# Patient Record
Sex: MALE | Race: WHITE | NOT HISPANIC OR LATINO | Employment: FULL TIME | URBAN - NONMETROPOLITAN AREA
[De-identification: names, ages, dates, MRNs, and addresses within clinical notes are randomized per-mention and may not be internally consistent; named-entity substitution may affect disease eponyms.]

---

## 2017-01-03 ENCOUNTER — TELEPHONE (OUTPATIENT)
Dept: INTERNAL MEDICINE | Facility: CLINIC | Age: 47
End: 2017-01-03

## 2017-01-03 DIAGNOSIS — R10.11 RUQ PAIN: Primary | ICD-10-CM

## 2017-01-03 NOTE — TELEPHONE ENCOUNTER
----- Message from Shelton Meraz sent at 1/3/2017  9:07 AM EST -----  Contact: PATIENT  Patient is requesting results from Ultrasound. He would like a callback after 3 pm.     [phone]

## 2017-01-03 NOTE — TELEPHONE ENCOUNTER
Please notify patient that US was negative.  We now need to check HIDA scan.  IT was ordered today.

## 2017-01-03 NOTE — TELEPHONE ENCOUNTER
PATIENT INFORMED, HE RECEIVED THE LETTER.    PATIENT STATES HE HAS HAD THREE MORE EPISODES. HE HAS BEEN WATCHING HIS DIET AND NOT EATING AFTER A CERTAIN TIME. HE IS CURIOUS AS TO WHAT COULD BE CAUSING THE EPISODES?    PLEASE ADVISE

## 2017-01-16 ENCOUNTER — HOSPITAL ENCOUNTER (OUTPATIENT)
Dept: GENERAL RADIOLOGY | Facility: HOSPITAL | Age: 47
Discharge: HOME | End: 2017-01-16
Attending: FAMILY MEDICINE

## 2017-01-18 ENCOUNTER — TELEPHONE (OUTPATIENT)
Dept: INTERNAL MEDICINE | Facility: CLINIC | Age: 47
End: 2017-01-18

## 2017-01-18 NOTE — TELEPHONE ENCOUNTER
PATIENT INFORMED HIDA SCAN RESULTS ARE NORMAL. HE WANTS TO KNOW WHAT THE NEXT STEP IS? OR IF HE NEEDS TO COME IN FOR A FOLLOW UP?

## 2017-01-18 NOTE — TELEPHONE ENCOUNTER
He needs a follow up so the results can be documented, his continued complaint can be documented in a note, then we can get a CT of his abdomen with oral contrast to see if there's any issues with his colon.

## 2017-03-21 ENCOUNTER — OFFICE VISIT (OUTPATIENT)
Dept: INTERNAL MEDICINE | Facility: CLINIC | Age: 47
End: 2017-03-21

## 2017-03-21 VITALS
HEIGHT: 66 IN | OXYGEN SATURATION: 97 % | BODY MASS INDEX: 29.57 KG/M2 | WEIGHT: 184 LBS | HEART RATE: 55 BPM | DIASTOLIC BLOOD PRESSURE: 80 MMHG | SYSTOLIC BLOOD PRESSURE: 120 MMHG

## 2017-03-21 DIAGNOSIS — Z79.899 ENCOUNTER FOR LONG-TERM CURRENT USE OF MEDICATION: ICD-10-CM

## 2017-03-21 DIAGNOSIS — K64.8 INTERNAL HEMORRHOIDS: ICD-10-CM

## 2017-03-21 DIAGNOSIS — I10 ESSENTIAL HYPERTENSION: ICD-10-CM

## 2017-03-21 DIAGNOSIS — Z12.5 ENCOUNTER FOR SPECIAL SCREENING EXAMINATION FOR NEOPLASM OF PROSTATE: ICD-10-CM

## 2017-03-21 DIAGNOSIS — E78.5 HYPERLIPIDEMIA LDL GOAL <100: Primary | ICD-10-CM

## 2017-03-21 DIAGNOSIS — M54.12 CERVICAL RADICULOPATHY: ICD-10-CM

## 2017-03-21 DIAGNOSIS — K92.1 HEMATOCHEZIA: ICD-10-CM

## 2017-03-21 PROCEDURE — 99214 OFFICE O/P EST MOD 30 MIN: CPT | Performed by: FAMILY MEDICINE

## 2017-03-21 NOTE — PROGRESS NOTES
Subjective   Ulices Negron is a 46 y.o. male.     History of Present Illness   Ulices is due today for yearly labs.  He is on statin tx which needs monitoring with labs.  VSS. NAD. He is not taking the depakote anymore.  He's not fasting today.  He is having some bleeding when wiping from internal hemorrhoids recently. He's changing his eating habits, not eating after 6pm, and not eating greasy foods, sodas, and he's not having any of the belly pains anymore.  He's having a lot more bleeding lately from rectum when he wipes.  He states that there's a lot more blood than what he's ever seen in the past.      The following portions of the patient's history were reviewed and updated as appropriate: allergies, current medications, past social history and problem list.    Review of Systems   Constitutional: Negative for appetite change, chills, fatigue, fever and unexpected weight change.   HENT: Negative for congestion, ear pain, hearing loss, nosebleeds, postnasal drip, rhinorrhea, sore throat, tinnitus and trouble swallowing.    Eyes: Negative for photophobia, discharge and visual disturbance.   Respiratory: Negative for cough, chest tightness, shortness of breath and wheezing.    Cardiovascular: Negative for chest pain, palpitations and leg swelling.   Gastrointestinal: Positive for anal bleeding. Negative for abdominal distention, abdominal pain, blood in stool, constipation, diarrhea, nausea and vomiting.   Endocrine: Negative for cold intolerance, heat intolerance, polydipsia, polyphagia and polyuria.   Musculoskeletal: Negative for arthralgias, back pain, joint swelling, myalgias, neck pain and neck stiffness.   Skin: Negative for color change, pallor, rash and wound.   Allergic/Immunologic: Negative for environmental allergies, food allergies and immunocompromised state.   Neurological: Negative for dizziness, tremors, seizures, weakness, numbness and headaches.   Hematological: Negative for adenopathy.  "Does not bruise/bleed easily.   Psychiatric/Behavioral: Negative for agitation, behavioral problems, confusion, hallucinations, self-injury and suicidal ideas. The patient is not nervous/anxious.        Objective   Visit Vitals   • /80   • Pulse 55   • Ht 66\" (167.6 cm)   • Wt 184 lb (83.5 kg)   • SpO2 97%   • BMI 29.7 kg/m2     Physical Exam   Constitutional: He is oriented to person, place, and time. He appears well-developed and well-nourished. No distress.   HENT:   Head: Normocephalic and atraumatic.   Right Ear: External ear normal.   Left Ear: External ear normal.   Nose: Nose normal.   Mouth/Throat: Oropharynx is clear and moist.   Eyes: Conjunctivae and EOM are normal. Pupils are equal, round, and reactive to light. Right eye exhibits no discharge. Left eye exhibits no discharge. No scleral icterus.   Neck: Normal range of motion. Neck supple. No JVD present. No tracheal deviation present. No thyromegaly present.   Cardiovascular: Normal rate, regular rhythm, normal heart sounds and intact distal pulses.  Exam reveals no gallop and no friction rub.    No murmur heard.  Pulmonary/Chest: Effort normal and breath sounds normal. No stridor. No respiratory distress. He has no wheezes. He has no rales. He exhibits no tenderness.   Abdominal: Soft. Bowel sounds are normal. He exhibits no distension and no mass. There is no tenderness. There is no rebound and no guarding. No hernia.   Musculoskeletal: Normal range of motion. He exhibits no edema, tenderness or deformity.   Lymphadenopathy:     He has no cervical adenopathy.   Neurological: He is alert and oriented to person, place, and time. He has normal reflexes. He displays normal reflexes. No cranial nerve deficit. He exhibits normal muscle tone.   Skin: Skin is warm and dry. No rash noted. No erythema. No pallor.   Psychiatric: He has a normal mood and affect. His behavior is normal. Judgment normal.       Assessment/Plan   Problem List Items Addressed " This Visit        Cardiovascular and Mediastinum    Hypertension    Relevant Orders    Lipid Panel    Comprehensive Metabolic Panel    CBC & Differential    T4, Free    TSH    Vitamin D 25 Hydroxy    POC Urinalysis Dipstick, Automated    PSA    Hyperlipidemia LDL goal <100 - Primary    Relevant Orders    Lipid Panel    Comprehensive Metabolic Panel    CBC & Differential    T4, Free    TSH    Vitamin D 25 Hydroxy    POC Urinalysis Dipstick, Automated    PSA    Internal hemorrhoids    Relevant Orders    Lipid Panel    Comprehensive Metabolic Panel    CBC & Differential    T4, Free    TSH    Vitamin D 25 Hydroxy    POC Urinalysis Dipstick, Automated    PSA    Ambulatory Referral to Gastroenterology       Nervous and Auditory    Cervical radiculopathy    Relevant Orders    Lipid Panel    Comprehensive Metabolic Panel    CBC & Differential    T4, Free    TSH    Vitamin D 25 Hydroxy    POC Urinalysis Dipstick, Automated    PSA       Other    Encounter for long-term current use of medication    Relevant Orders    Lipid Panel    Comprehensive Metabolic Panel    CBC & Differential    T4, Free    TSH    Vitamin D 25 Hydroxy    POC Urinalysis Dipstick, Automated    PSA    Encounter for special screening examination for neoplasm of prostate    Relevant Orders    Lipid Panel    Comprehensive Metabolic Panel    CBC & Differential    T4, Free    TSH    Vitamin D 25 Hydroxy    POC Urinalysis Dipstick, Automated    PSA    Hematochezia    Relevant Orders    Ambulatory Referral to Gastroenterology    CT Abdomen Pelvis Without Contrast           get labs, and fu in a month to go over findings.  Get CT and Colonoscopy, and discuss possible surgeries with Surgeon that does colonoscopy as far as having internal hemorrhoids removed surgicaly.

## 2017-03-23 ENCOUNTER — HOSPITAL ENCOUNTER (OUTPATIENT)
Dept: CT IMAGING | Facility: HOSPITAL | Age: 47
Discharge: HOME | End: 2017-03-23
Attending: FAMILY MEDICINE | Admitting: FAMILY MEDICINE

## 2017-03-23 DIAGNOSIS — K92.1 HEMATOCHEZIA: ICD-10-CM

## 2017-03-23 PROCEDURE — 74176 CT ABD & PELVIS W/O CONTRAST: CPT

## 2017-03-27 ENCOUNTER — OFFICE VISIT (OUTPATIENT)
Dept: SURGERY | Facility: CLINIC | Age: 47
End: 2017-03-27

## 2017-03-27 VITALS
BODY MASS INDEX: 28.93 KG/M2 | WEIGHT: 180 LBS | TEMPERATURE: 97.8 F | HEIGHT: 66 IN | SYSTOLIC BLOOD PRESSURE: 126 MMHG | RESPIRATION RATE: 16 BRPM | DIASTOLIC BLOOD PRESSURE: 88 MMHG | HEART RATE: 84 BPM

## 2017-03-27 DIAGNOSIS — Z12.11 SCREENING FOR COLON CANCER: ICD-10-CM

## 2017-03-27 DIAGNOSIS — K62.5 RECTAL BLEED: Primary | ICD-10-CM

## 2017-03-27 PROCEDURE — 99204 OFFICE O/P NEW MOD 45 MIN: CPT | Performed by: SURGERY

## 2017-03-27 NOTE — PROGRESS NOTES
"  Reason for Consultation: Colonoscopy    Chief complaint   Chief Complaint   Patient presents with   • Colonoscopy     evaluation.       Subjective .     History of present illness:  I saw the patient in the office today as a consultation for evaluation and treatment of bouts of bright red blood per rectum occurring after bowel movements only which has been ongoing off and on for @ 3 weeks, he denies change in bowel habits including constipation/diarrhea and he denies dark colored stool, he stated \"I do know that I have a hemorrhoid that flares up from time to time.\"  Patient denies family history of colon cancer, no previous colonoscopy.  No pain or tearing with bowel movements.  Bleeding occurred with the first bowel movement of the day.    Review of Systems   Constitutional: Negative for chills, diaphoresis, fatigue and fever.   HENT: Negative for dental problem, drooling, ear discharge and hearing loss.    Respiratory: Negative for cough, choking, chest tightness and shortness of breath.    Cardiovascular: Negative for chest pain, palpitations and leg swelling.   Gastrointestinal: Positive for anal bleeding and rectal pain.   Endocrine: Negative for cold intolerance and heat intolerance.   Genitourinary: Negative for flank pain, frequency and hematuria.   Neurological: Negative for seizures, light-headedness, numbness and headaches.   Psychiatric/Behavioral: Negative for agitation, behavioral problems and confusion.       History  Past Medical History:   Diagnosis Date   • Hyperlipidemia    • Hypertension        Past Surgical History:   Procedure Laterality Date   • BACK SURGERY         Family History   Problem Relation Age of Onset   • Heart attack Maternal Aunt    • Heart disease Maternal Grandmother    • Heart disease Paternal Grandfather        Social History   Substance Use Topics   • Smoking status: Former Smoker     Years: 20.00     Types: Cigarettes     Quit date: 2007   • Smokeless tobacco: None   • " Alcohol use No       Allergies:  No Known Allergies    Medications:    Current Outpatient Prescriptions:   •  omeprazole (PriLOSEC) 40 MG capsule, Take 1 capsule by mouth daily., Disp: 90 capsule, Rfl: 3  •  atenolol (TENORMIN) 25 MG tablet, Take 1 tablet by mouth daily., Disp: 90 tablet, Rfl: 3    Objective     Vital Signs   Temp:  [97.8 °F (36.6 °C)] 97.8 °F (36.6 °C)  Heart Rate:  [84] 84  Resp:  [16] 16  BP: (126)/(88) 126/88    Physical Exam:     General Appearance:    Alert, cooperative, in no acute distress   Head:    Normocephalic, without obvious abnormality, atraumatic   Eyes:            Lids and lashes normal, conjunctivae and sclerae normal, no   icterus, no pallor, corneas clear, PERRL   Ears:    Ears appear intact with no abnormalities noted   Throat:   No oral lesions, no thrush, oral mucosa moist   Neck:   No adenopathy, supple, trachea midline, no thyromegaly,  no JVD   Lungs:     Clear to auscultation,respirations regular, even and                  unlabored    Heart:    Regular rhythm and normal rate, normal S1 and S2, no            murmur   Abdomen:     no masses, no organomegaly, soft non-tender, non-distended, no guarding, there is evidence of right upper quadrant tenderness   Extremities:   Moves all extremities well, no edema, no cyanosis, no             redness   Pulses:   Pulses palpable and equal bilaterally   Skin:   No bleeding, bruising or rash   Lymph nodes:   No palpable adenopathy   Neurologic:   Cranial nerves 2 - 12 grossly intact, sensation intact        Results Review:   I reviewed the patient's new clinical results.      Assessment/Plan :    1. Rectal bleed    2. Screening for colon cancer        I recommend a colonoscopy for further evaluation. The procedure was explained as well as the risks which include but are not limited to bleeding, infection, perforation, abdominal pain etc. The patient understands these risks and the procedure and wishes to proceed.        Fransisco ZHANG  MD Connie  03/27/17

## 2017-04-04 ENCOUNTER — OUTSIDE FACILITY SERVICE (OUTPATIENT)
Dept: SURGERY | Facility: CLINIC | Age: 47
End: 2017-04-04

## 2017-04-04 PROCEDURE — G0500 MOD SEDAT ENDO SERVICE >5YRS: HCPCS | Performed by: SURGERY

## 2017-04-04 PROCEDURE — 45380 COLONOSCOPY AND BIOPSY: CPT | Performed by: SURGERY

## 2017-04-12 ENCOUNTER — OFFICE VISIT (OUTPATIENT)
Dept: SURGERY | Facility: CLINIC | Age: 47
End: 2017-04-12

## 2017-04-12 VITALS
WEIGHT: 180 LBS | DIASTOLIC BLOOD PRESSURE: 86 MMHG | SYSTOLIC BLOOD PRESSURE: 134 MMHG | TEMPERATURE: 98 F | BODY MASS INDEX: 29.05 KG/M2

## 2017-04-12 DIAGNOSIS — K63.5 COLON POLYPS: Primary | ICD-10-CM

## 2017-04-12 DIAGNOSIS — K62.5 RECTAL BLEED: ICD-10-CM

## 2017-04-12 PROCEDURE — 99213 OFFICE O/P EST LOW 20 MIN: CPT | Performed by: SURGERY

## 2017-04-12 NOTE — PROGRESS NOTES
Reason for Consultation: Follow-up colonoscopy    Chief Complaint:   Chief Complaint   Patient presents with   • Follow-up     Pt here for follow up visit colonoscopy. He denies problems at this time.       History of present illness:  I saw the patient in the office today as a followup from their recent colonoscopy with polypectomy, the pathology report did show active colitis in the rectum, crypts and small abscesses seen.  They state that they have done well and are having no complaints.  He has minimal bleeding now no pain.    Review of Systems   Constitutional: Negative for chills, fever and unexpected weight change.   HENT: Negative for trouble swallowing and voice change.    Eyes: Negative for visual disturbance.   Respiratory: Negative for apnea, cough, chest tightness, shortness of breath and wheezing.    Cardiovascular: Negative for chest pain, palpitations and leg swelling.   Gastrointestinal: Negative for abdominal distention, abdominal pain, anal bleeding, blood in stool, constipation, diarrhea, nausea, rectal pain and vomiting.   Endocrine: Negative for cold intolerance and heat intolerance.   Genitourinary: Negative for difficulty urinating, dysuria, flank pain, scrotal swelling and testicular pain.   Musculoskeletal: Negative for back pain, gait problem and joint swelling.   Skin: Negative for color change, rash and wound.   Neurological: Negative for dizziness, syncope, speech difficulty, weakness, numbness and headaches.   Hematological: Negative for adenopathy. Does not bruise/bleed easily.   Psychiatric/Behavioral: Negative for confusion. The patient is not nervous/anxious.        Allergies:  No Known Allergies    Medications:    Current Outpatient Prescriptions:   •  atenolol (TENORMIN) 25 MG tablet, Take 1 tablet by mouth daily., Disp: 90 tablet, Rfl: 3  •  omeprazole (PriLOSEC) 40 MG capsule, Take 1 capsule by mouth daily., Disp: 90 capsule, Rfl: 3    Vital Signs:  Temp:  [98 °F (36.7 °C)] 98  °F (36.7 °C)  BP: (134)/(86) 134/86    Physical Exam:   General Appearance:    Alert, cooperative, in no acute distress   Abdomen:     no masses, no organomegaly, soft non-tender, non-distended, no guarding, wounds are well healed, no evidence of recurrent hernia   Chest:      Clear toausculation     Assessment / Plan:    1. Colon polyps    2. Rectal bleed     colitis, localized to the rectum.  Patient prescribed steroid enemas for 14 days, repeat colonoscopy in one year.    I did discuss the situation with the patient today in the office and they have done well from their recent colonoscopy with polypectomy.  I have released the patient back to normal activity.  I need to see the patient back in the office in 3 years and they will need to have repeat colonoscopy at that time.    Fransisco Rosales MD  04/12/17

## 2017-04-19 ENCOUNTER — OFFICE VISIT (OUTPATIENT)
Dept: INTERNAL MEDICINE | Facility: CLINIC | Age: 47
End: 2017-04-19

## 2017-04-19 VITALS
DIASTOLIC BLOOD PRESSURE: 70 MMHG | OXYGEN SATURATION: 95 % | SYSTOLIC BLOOD PRESSURE: 110 MMHG | HEIGHT: 66 IN | BODY MASS INDEX: 29.09 KG/M2 | WEIGHT: 181 LBS | HEART RATE: 59 BPM

## 2017-04-19 DIAGNOSIS — I10 ESSENTIAL HYPERTENSION: ICD-10-CM

## 2017-04-19 DIAGNOSIS — Z79.899 ENCOUNTER FOR LONG-TERM CURRENT USE OF MEDICATION: ICD-10-CM

## 2017-04-19 DIAGNOSIS — E78.5 HYPERLIPIDEMIA LDL GOAL <100: Primary | ICD-10-CM

## 2017-04-19 DIAGNOSIS — L98.9 SKIN LESION OF HAND: ICD-10-CM

## 2017-04-19 PROCEDURE — 99213 OFFICE O/P EST LOW 20 MIN: CPT | Performed by: FAMILY MEDICINE

## 2017-04-19 RX ORDER — ATORVASTATIN CALCIUM 10 MG/1
10 TABLET, FILM COATED ORAL NIGHTLY
Qty: 30 TABLET | Refills: 11 | Status: SHIPPED | OUTPATIENT
Start: 2017-04-19 | End: 2020-01-08 | Stop reason: SDDI

## 2017-04-19 RX ORDER — LORATADINE 10 MG/1
10 TABLET ORAL DAILY
COMMUNITY
End: 2020-01-08 | Stop reason: SDDI

## 2017-04-19 NOTE — PROGRESS NOTES
Subjective   Ulices Negron is a 47 y.o. male.     History of Present Illness   Ulices is doing suppositories and abx for his hemorrhoids.  He hasn't gotten the labs done yet.  VSS> NAD.  He stopped the lipitor when the refills ran out last month.    He has what appears to be a wart that is to the bone on the dorsum of his middle finger. He states it swells, and was told by Bobby Whiting that it was a cyst that could be cut out if needed.  He would like to see her again over it, as it is getting worse.    The following portions of the patient's history were reviewed and updated as appropriate: allergies, current medications, past social history and problem list.    Review of Systems   Constitutional: Negative for appetite change, chills, fatigue, fever and unexpected weight change.   HENT: Negative for congestion, ear pain, hearing loss, nosebleeds, postnasal drip, rhinorrhea, sore throat, tinnitus and trouble swallowing.    Eyes: Negative for photophobia, discharge and visual disturbance.   Respiratory: Negative for cough, chest tightness, shortness of breath and wheezing.    Cardiovascular: Negative for chest pain, palpitations and leg swelling.   Gastrointestinal: Negative for abdominal distention, abdominal pain, blood in stool, constipation, diarrhea, nausea and vomiting.   Endocrine: Negative for cold intolerance, heat intolerance, polydipsia, polyphagia and polyuria.   Musculoskeletal: Negative for arthralgias, back pain, joint swelling, myalgias, neck pain and neck stiffness.   Skin: Negative for color change, pallor, rash and wound.   Allergic/Immunologic: Negative for environmental allergies, food allergies and immunocompromised state.   Neurological: Negative for dizziness, tremors, seizures, weakness, numbness and headaches.   Hematological: Negative for adenopathy. Does not bruise/bleed easily.   Psychiatric/Behavioral: Negative for agitation, behavioral problems, confusion, hallucinations,  "self-injury and suicidal ideas. The patient is not nervous/anxious.        Objective   /70  Pulse 59  Ht 66\" (167.6 cm)  Wt 181 lb (82.1 kg)  SpO2 95%  BMI 29.21 kg/m2  Physical Exam   Constitutional: He is oriented to person, place, and time. He appears well-developed and well-nourished. No distress.   HENT:   Head: Normocephalic and atraumatic.   Right Ear: External ear normal.   Left Ear: External ear normal.   Nose: Nose normal.   Mouth/Throat: Oropharynx is clear and moist.   Eyes: Conjunctivae and EOM are normal. Pupils are equal, round, and reactive to light. Right eye exhibits no discharge. Left eye exhibits no discharge. No scleral icterus.   Neck: Normal range of motion. Neck supple. No JVD present. No tracheal deviation present. No thyromegaly present.   Cardiovascular: Normal rate, regular rhythm, normal heart sounds and intact distal pulses.  Exam reveals no gallop and no friction rub.    No murmur heard.  Pulmonary/Chest: Effort normal and breath sounds normal. No stridor. No respiratory distress. He has no wheezes. He has no rales. He exhibits no tenderness.   Abdominal: Soft. Bowel sounds are normal. He exhibits no distension and no mass. There is no tenderness. There is no rebound and no guarding. No hernia.   Musculoskeletal: Normal range of motion. He exhibits no edema, tenderness or deformity.   Lymphadenopathy:     He has no cervical adenopathy.   Neurological: He is alert and oriented to person, place, and time. He has normal reflexes. He displays normal reflexes. No cranial nerve deficit. He exhibits normal muscle tone.   Skin: Skin is warm and dry. No rash noted. No erythema. No pallor.   Lesion on the dorsum of the middle finger, beside the cuticle.    Psychiatric: He has a normal mood and affect. His behavior is normal. Judgment normal.       Assessment/Plan   Problem List Items Addressed This Visit        Cardiovascular and Mediastinum    Hypertension    Hyperlipidemia LDL goal " <100 - Primary    Relevant Medications    atorvastatin (LIPITOR) 10 MG tablet        Get labs. Fu in 1-2 months.

## 2017-08-28 DIAGNOSIS — I10 ESSENTIAL HYPERTENSION: ICD-10-CM

## 2017-08-28 DIAGNOSIS — K21.9 GASTROESOPHAGEAL REFLUX DISEASE WITHOUT ESOPHAGITIS: ICD-10-CM

## 2017-08-28 RX ORDER — ATENOLOL 25 MG/1
TABLET ORAL
Qty: 90 TABLET | Refills: 3 | Status: SHIPPED | OUTPATIENT
Start: 2017-08-28 | End: 2020-01-08 | Stop reason: SDDI

## 2017-08-28 RX ORDER — OMEPRAZOLE 40 MG/1
CAPSULE, DELAYED RELEASE ORAL
Qty: 90 CAPSULE | Refills: 3 | Status: SHIPPED | OUTPATIENT
Start: 2017-08-28 | End: 2020-01-08 | Stop reason: SDDI

## 2018-02-26 ENCOUNTER — HOSPITAL ENCOUNTER (EMERGENCY)
Facility: HOSPITAL | Age: 48
Discharge: HOME | End: 2018-02-26
Attending: EMERGENCY MEDICINE | Admitting: EMERGENCY MEDICINE

## 2018-02-26 ENCOUNTER — APPOINTMENT (OUTPATIENT)
Dept: CT IMAGING | Facility: HOSPITAL | Age: 48
End: 2018-02-26

## 2018-02-26 ENCOUNTER — APPOINTMENT (OUTPATIENT)
Dept: ULTRASOUND IMAGING | Facility: HOSPITAL | Age: 48
End: 2018-02-26

## 2018-02-26 VITALS
WEIGHT: 171 LBS | BODY MASS INDEX: 27.48 KG/M2 | HEART RATE: 61 BPM | SYSTOLIC BLOOD PRESSURE: 158 MMHG | RESPIRATION RATE: 16 BRPM | OXYGEN SATURATION: 97 % | HEIGHT: 66 IN | DIASTOLIC BLOOD PRESSURE: 89 MMHG | TEMPERATURE: 98.6 F

## 2018-02-26 DIAGNOSIS — R19.7 NAUSEA, VOMITING AND DIARRHEA: Primary | ICD-10-CM

## 2018-02-26 DIAGNOSIS — K21.9 GASTROESOPHAGEAL REFLUX DISEASE, ESOPHAGITIS PRESENCE NOT SPECIFIED: ICD-10-CM

## 2018-02-26 DIAGNOSIS — R11.2 NAUSEA, VOMITING AND DIARRHEA: Primary | ICD-10-CM

## 2018-02-26 LAB
ALBUMIN SERPL-MCNC: 4.4 G/DL (ref 3.5–5)
ALBUMIN/GLOB SERPL: 1.5 G/DL (ref 1–2)
ALP SERPL-CCNC: 84 U/L (ref 38–126)
ALT SERPL W P-5'-P-CCNC: 42 U/L (ref 13–69)
ANION GAP SERPL CALCULATED.3IONS-SCNC: 16.8 MMOL/L
AST SERPL-CCNC: 30 U/L (ref 15–46)
BACTERIA UR QL AUTO: ABNORMAL /HPF
BASOPHILS # BLD AUTO: 0.02 10*3/MM3 (ref 0–0.2)
BASOPHILS NFR BLD AUTO: 0.1 % (ref 0–2.5)
BILIRUB SERPL-MCNC: 0.7 MG/DL (ref 0.2–1.3)
BILIRUB UR QL STRIP: NEGATIVE
BUN BLD-MCNC: 14 MG/DL (ref 7–20)
BUN/CREAT SERPL: 17.5 (ref 6.3–21.9)
CALCIUM SPEC-SCNC: 9.3 MG/DL (ref 8.4–10.2)
CHLORIDE SERPL-SCNC: 104 MMOL/L (ref 98–107)
CLARITY UR: CLEAR
CO2 SERPL-SCNC: 30 MMOL/L (ref 26–30)
COLOR UR: YELLOW
CREAT BLD-MCNC: 0.8 MG/DL (ref 0.6–1.3)
DEPRECATED RDW RBC AUTO: 39.9 FL (ref 37–54)
EOSINOPHIL # BLD AUTO: 0 10*3/MM3 (ref 0–0.7)
EOSINOPHIL NFR BLD AUTO: 0 % (ref 0–7)
ERYTHROCYTE [DISTWIDTH] IN BLOOD BY AUTOMATED COUNT: 12.9 % (ref 11.5–14.5)
GFR SERPL CREATININE-BSD FRML MDRD: 105 ML/MIN/1.73
GLOBULIN UR ELPH-MCNC: 3 GM/DL
GLUCOSE BLD-MCNC: 149 MG/DL (ref 74–98)
GLUCOSE UR STRIP-MCNC: NEGATIVE MG/DL
HCT VFR BLD AUTO: 44.7 % (ref 42–52)
HGB BLD-MCNC: 15.8 G/DL (ref 14–18)
HGB UR QL STRIP.AUTO: ABNORMAL
HYALINE CASTS UR QL AUTO: ABNORMAL /LPF
IMM GRANULOCYTES # BLD: 0.13 10*3/MM3 (ref 0–0.06)
IMM GRANULOCYTES NFR BLD: 0.8 % (ref 0–0.6)
KETONES UR QL STRIP: ABNORMAL
LEUKOCYTE ESTERASE UR QL STRIP.AUTO: NEGATIVE
LIPASE SERPL-CCNC: 146 U/L (ref 23–300)
LYMPHOCYTES # BLD AUTO: 1.51 10*3/MM3 (ref 0.6–3.4)
LYMPHOCYTES NFR BLD AUTO: 9.2 % (ref 10–50)
MCH RBC QN AUTO: 30.4 PG (ref 27–31)
MCHC RBC AUTO-ENTMCNC: 35.3 G/DL (ref 30–37)
MCV RBC AUTO: 86.1 FL (ref 80–94)
MONOCYTES # BLD AUTO: 1 10*3/MM3 (ref 0–0.9)
MONOCYTES NFR BLD AUTO: 6.1 % (ref 0–12)
MUCOUS THREADS URNS QL MICRO: ABNORMAL /HPF
NEUTROPHILS # BLD AUTO: 13.78 10*3/MM3 (ref 2–6.9)
NEUTROPHILS NFR BLD AUTO: 83.8 % (ref 37–80)
NITRITE UR QL STRIP: NEGATIVE
NRBC BLD MANUAL-RTO: 0 /100 WBC (ref 0–0)
PH UR STRIP.AUTO: 6 [PH] (ref 5–8)
PLATELET # BLD AUTO: 320 10*3/MM3 (ref 130–400)
PMV BLD AUTO: 9.6 FL (ref 6–12)
POTASSIUM BLD-SCNC: 3.8 MMOL/L (ref 3.5–5.1)
PROT SERPL-MCNC: 7.4 G/DL (ref 6.3–8.2)
PROT UR QL STRIP: ABNORMAL
RBC # BLD AUTO: 5.19 10*6/MM3 (ref 4.7–6.1)
RBC # UR: ABNORMAL /HPF
REF LAB TEST METHOD: ABNORMAL
SODIUM BLD-SCNC: 147 MMOL/L (ref 137–145)
SP GR UR STRIP: >=1.03 (ref 1–1.03)
SQUAMOUS #/AREA URNS HPF: ABNORMAL /HPF
TROPONIN I SERPL-MCNC: <0.012 NG/ML (ref 0–0.03)
UROBILINOGEN UR QL STRIP: ABNORMAL
WBC NRBC COR # BLD: 16.44 10*3/MM3 (ref 4.8–10.8)
WBC UR QL AUTO: ABNORMAL /HPF

## 2018-02-26 PROCEDURE — 96375 TX/PRO/DX INJ NEW DRUG ADDON: CPT

## 2018-02-26 PROCEDURE — 25010000002 ONDANSETRON PER 1 MG: Performed by: PHYSICIAN ASSISTANT

## 2018-02-26 PROCEDURE — 74176 CT ABD & PELVIS W/O CONTRAST: CPT

## 2018-02-26 PROCEDURE — 96374 THER/PROPH/DIAG INJ IV PUSH: CPT

## 2018-02-26 PROCEDURE — 84484 ASSAY OF TROPONIN QUANT: CPT | Performed by: PHYSICIAN ASSISTANT

## 2018-02-26 PROCEDURE — 96361 HYDRATE IV INFUSION ADD-ON: CPT

## 2018-02-26 PROCEDURE — 80053 COMPREHEN METABOLIC PANEL: CPT | Performed by: PHYSICIAN ASSISTANT

## 2018-02-26 PROCEDURE — 93005 ELECTROCARDIOGRAM TRACING: CPT | Performed by: PHYSICIAN ASSISTANT

## 2018-02-26 PROCEDURE — 76705 ECHO EXAM OF ABDOMEN: CPT

## 2018-02-26 PROCEDURE — 81001 URINALYSIS AUTO W/SCOPE: CPT | Performed by: PHYSICIAN ASSISTANT

## 2018-02-26 PROCEDURE — 85025 COMPLETE CBC W/AUTO DIFF WBC: CPT | Performed by: PHYSICIAN ASSISTANT

## 2018-02-26 PROCEDURE — 83690 ASSAY OF LIPASE: CPT | Performed by: PHYSICIAN ASSISTANT

## 2018-02-26 PROCEDURE — 99283 EMERGENCY DEPT VISIT LOW MDM: CPT

## 2018-02-26 RX ORDER — FAMOTIDINE 10 MG/ML
20 INJECTION, SOLUTION INTRAVENOUS ONCE
Status: COMPLETED | OUTPATIENT
Start: 2018-02-26 | End: 2018-02-26

## 2018-02-26 RX ORDER — ONDANSETRON 4 MG/1
4 TABLET, ORALLY DISINTEGRATING ORAL EVERY 8 HOURS PRN
Qty: 8 TABLET | Refills: 0 | Status: SHIPPED | OUTPATIENT
Start: 2018-02-26 | End: 2020-01-08 | Stop reason: SDDI

## 2018-02-26 RX ORDER — ONDANSETRON 2 MG/ML
4 INJECTION INTRAMUSCULAR; INTRAVENOUS ONCE
Status: COMPLETED | OUTPATIENT
Start: 2018-02-26 | End: 2018-02-26

## 2018-02-26 RX ORDER — SODIUM CHLORIDE 0.9 % (FLUSH) 0.9 %
10 SYRINGE (ML) INJECTION AS NEEDED
Status: DISCONTINUED | OUTPATIENT
Start: 2018-02-26 | End: 2018-02-26 | Stop reason: HOSPADM

## 2018-02-26 RX ORDER — SUCRALFATE ORAL 1 G/10ML
1 SUSPENSION ORAL 2 TIMES DAILY WITH MEALS
Qty: 100 ML | Refills: 0 | Status: SHIPPED | OUTPATIENT
Start: 2018-02-26 | End: 2018-03-03

## 2018-02-26 RX ADMIN — ONDANSETRON HYDROCHLORIDE 4 MG: 2 INJECTION, SOLUTION INTRAMUSCULAR; INTRAVENOUS at 11:39

## 2018-02-26 RX ADMIN — SODIUM CHLORIDE 1000 ML: 9 INJECTION, SOLUTION INTRAVENOUS at 11:35

## 2018-02-26 RX ADMIN — FAMOTIDINE 20 MG: 10 INJECTION INTRAVENOUS at 12:52

## 2018-02-28 ENCOUNTER — OFFICE VISIT (OUTPATIENT)
Dept: SURGERY | Facility: CLINIC | Age: 48
End: 2018-02-28

## 2018-02-28 VITALS
TEMPERATURE: 97.2 F | HEART RATE: 62 BPM | WEIGHT: 174 LBS | DIASTOLIC BLOOD PRESSURE: 92 MMHG | SYSTOLIC BLOOD PRESSURE: 130 MMHG | HEIGHT: 66 IN | BODY MASS INDEX: 27.97 KG/M2 | OXYGEN SATURATION: 97 %

## 2018-02-28 DIAGNOSIS — K40.20 NON-RECURRENT BILATERAL INGUINAL HERNIA WITHOUT OBSTRUCTION OR GANGRENE: ICD-10-CM

## 2018-02-28 DIAGNOSIS — K81.9 CHOLECYSTITIS: ICD-10-CM

## 2018-02-28 DIAGNOSIS — K80.10 CALCULUS OF GALLBLADDER WITH CHRONIC CHOLECYSTITIS WITHOUT OBSTRUCTION: Primary | ICD-10-CM

## 2018-02-28 DIAGNOSIS — R10.11 RIGHT UPPER QUADRANT ABDOMINAL PAIN: ICD-10-CM

## 2018-02-28 PROCEDURE — 99204 OFFICE O/P NEW MOD 45 MIN: CPT | Performed by: SURGERY

## 2018-02-28 NOTE — PROGRESS NOTES
Patient: Ulices Negron    YOB: 1970    Date: 02/28/2018    Primary Care Provider: Lorenzo Bermudez DO    Chief Complaint   Patient presents with   • Abdominal Pain     Epigastric pain       Subjective .     History of present illness:  The patient is here today for stabbing RUQ pain and umbilical pain.  He has had the RUQ pain intermittently for several years.  He says he was eating a hamburger when the pain started.  He says he was having nausea and vomiting last night in the ER.  He complains of diarrhea as well.  He had a ct scan and it showed small bilateral inguinal hernias containing fat.  He had an ultrasound at the ER that showed small echogenic foci proximal gallbladder favor non shadowing stones or small polyps. Patient has fatty food intolerance and pain in her right upper quadrant.  Pain was 7 out of 10, required visits to the ER.  Symptoms have been going on for over 2 years.  Inguinal hernias are asymptomatic.  No change in bowel habits.      The following portions of the patient's history were reviewed and updated as appropriate: allergies, current medications, past family history, past medical history, past social history, past surgical history and problem list.      Review of Systems   Constitutional: Negative for chills, fever and unexpected weight change.   HENT: Negative for trouble swallowing and voice change.    Eyes: Negative for visual disturbance.   Respiratory: Negative for apnea, cough, chest tightness, shortness of breath and wheezing.    Cardiovascular: Negative for chest pain, palpitations and leg swelling.   Gastrointestinal: Positive for abdominal pain, diarrhea, nausea and vomiting. Negative for abdominal distention, anal bleeding, blood in stool, constipation and rectal pain.   Endocrine: Negative for cold intolerance and heat intolerance.   Genitourinary: Negative for difficulty urinating, dysuria, flank pain, scrotal swelling and testicular pain.  "  Musculoskeletal: Negative for back pain, gait problem and joint swelling.   Skin: Negative for color change, rash and wound.   Neurological: Negative for dizziness, syncope, speech difficulty, weakness, numbness and headaches.   Hematological: Negative for adenopathy. Does not bruise/bleed easily.   Psychiatric/Behavioral: Negative for confusion. The patient is not nervous/anxious.        Allergies:  No Known Allergies    Medications:    Current Outpatient Prescriptions:   •  atenolol (TENORMIN) 25 MG tablet, TAKE ONE TABLET BY MOUTH DAILY, Disp: 90 tablet, Rfl: 3  •  atorvastatin (LIPITOR) 10 MG tablet, Take 1 tablet by mouth Every Night., Disp: 30 tablet, Rfl: 11  •  loratadine (CLARITIN) 10 MG tablet, Take 10 mg by mouth Daily., Disp: , Rfl:   •  omeprazole (priLOSEC) 40 MG capsule, TAKE ONE CAPSULE BY MOUTH DAILY, Disp: 90 capsule, Rfl: 3  •  ondansetron ODT (ZOFRAN-ODT) 4 MG disintegrating tablet, Take 1 tablet by mouth Every 8 (Eight) Hours As Needed for Nausea or Vomiting., Disp: 8 tablet, Rfl: 0  •  sucralfate (CARAFATE) 1 GM/10ML suspension, Take 10 mL by mouth 2 (Two) Times a Day With Meals for 5 days., Disp: 100 mL, Rfl: 0    History\"  Past Medical History:   Diagnosis Date   • Hyperlipidemia    • Hypertension        Past Surgical History:   Procedure Laterality Date   • BACK SURGERY     • COLONOSCOPY      2017-Dr Rosales       Family History   Problem Relation Age of Onset   • Heart attack Maternal Aunt    • Heart disease Maternal Grandmother    • Heart disease Paternal Grandfather        Social History   Substance Use Topics   • Smoking status: Former Smoker     Years: 20.00     Types: Cigarettes     Quit date: 2007   • Smokeless tobacco: Never Used   • Alcohol use No        Objective     Vital Signs:   /92  Pulse 62  Temp 97.2 °F (36.2 °C) (Temporal Artery )   Ht 167.6 cm (66\")  Wt 78.9 kg (174 lb)  SpO2 97%  BMI 28.08 kg/m2    Physical Exam:   General Appearance:    Alert, cooperative, in " no acute distress   Head:    Normocephalic, without obvious abnormality, atraumatic   Eyes:            Lids and lashes normal, conjunctivae and sclerae normal, no   icterus, no pallor, corneas clear, PERRLA   Ears:    Ears appear intact with no abnormalities noted   Throat:   No oral lesions, no thrush, oral mucosa moist   Neck:   No adenopathy, supple, trachea midline, no thyromegaly, no   carotid bruit, no JVD   Lungs:     Clear to auscultation,respirations regular, even and                  unlabored    Heart:    Regular rhythm and normal rate, normal S1 and S2, no            murmur, no gallop, no rub, no click   Chest Wall:    No abnormalities observed   Abdomen:     Normal bowel sounds, no masses, no organomegaly, soft        And tender right upper quadrant with mild guarding.  Bilateral hernias, reducible.     Extremities:   Moves all extremities well, no edema, no cyanosis, no             redness   Pulses:   Pulses palpable and equal bilaterally   Skin:   No bleeding, bruising or rash   Lymph nodes:   No palpable adenopathy   Neurologic:   Cranial nerves 2 - 12 grossly intact, sensation intact, DTR       present and equal bilaterally     Results Review:   I reviewed the patient's new clinical results.    Assessment/Plan     1. Calculus of gallbladder with chronic cholecystitis without obstruction    2. Cholecystitis    3. Right upper quadrant abdominal pain    4. Non-recurrent bilateral inguinal hernia without obstruction or gangrene        Patient scheduled for laparoscopic cholecystectomy with cholangiogram.  Risk of bleeding, infection and possible bile leak discussed and patient agreeable.  At this time we will not repair hernias, there are asymptomatic and only have preperitoneal fat in the sac.    I discussed the patients findings and my recommendations with patient    Review of Systems was reviewed and confirmed as accurate today.    Electronically signed by Fransisco Rosales MD  02/28/18    Scribed for  Fransisco Rosales MD by Michelle Milligan. 2/28/2018  11:35 AM    .

## 2018-03-06 ENCOUNTER — OUTSIDE FACILITY SERVICE (OUTPATIENT)
Dept: SURGERY | Facility: CLINIC | Age: 48
End: 2018-03-06

## 2018-03-06 PROCEDURE — 47563 LAPARO CHOLECYSTECTOMY/GRAPH: CPT | Performed by: SURGERY

## 2018-03-12 ENCOUNTER — OFFICE VISIT (OUTPATIENT)
Dept: SURGERY | Facility: CLINIC | Age: 48
End: 2018-03-12

## 2018-03-12 VITALS
WEIGHT: 173.94 LBS | BODY MASS INDEX: 27.95 KG/M2 | OXYGEN SATURATION: 99 % | TEMPERATURE: 98.4 F | HEIGHT: 66 IN | HEART RATE: 79 BPM

## 2018-03-12 DIAGNOSIS — Z48.89 POSTOPERATIVE VISIT: Primary | ICD-10-CM

## 2018-03-12 PROCEDURE — 99024 POSTOP FOLLOW-UP VISIT: CPT | Performed by: SURGERY

## 2018-03-12 RX ORDER — HYDROCODONE BITARTRATE AND ACETAMINOPHEN 5; 325 MG/1; MG/1
TABLET ORAL
Refills: 0 | COMMUNITY
Start: 2018-03-06 | End: 2018-06-13

## 2018-06-12 NOTE — PROGRESS NOTES
Patient: Ulices Negron    YOB: 1970    Date: 06/13/2018    Primary Care Provider: No Known Provider    Reason for Consultation: Hernia    Chief Complaint   Patient presents with   • Abdominal Pain       Subjective .     History of present illness:  I saw the patient in the office today as a consultation for evaluation and treatment of bilateral inguinal pain. Patient complains of pain @ testicles and bilateral inguinal worse with jumping, lifting and tugging.  Patient is s/p lap dangelo done in 3/2018.  He denies changes in bowel habits or nausea. Patient also has right testicular and scrotal pain with inflammation.  Hernias are reducible, increased with walking and climbing stairs.  No change in bowel habits and no rectal bleeding.  No family history of colon cancer.  No weight loss or anemia.    The following portions of the patient's history were reviewed and updated as appropriate: allergies, current medications, past family history, past medical history, past social history, past surgical history and problem list.    Review of Systems   Constitutional: Negative for chills, fever and unexpected weight change.   HENT: Negative for trouble swallowing and voice change.    Eyes: Negative for visual disturbance.   Respiratory: Negative for apnea, cough, chest tightness, shortness of breath and wheezing.    Cardiovascular: Negative for chest pain, palpitations and leg swelling.   Gastrointestinal: Negative for abdominal distention, abdominal pain (bilateral inguinal pain), anal bleeding, blood in stool, constipation, diarrhea, nausea, rectal pain and vomiting.   Endocrine: Negative for cold intolerance and heat intolerance.   Genitourinary: Negative for difficulty urinating, dysuria, flank pain, scrotal swelling and testicular pain.   Musculoskeletal: Negative for back pain, gait problem and joint swelling.   Skin: Negative for color change, rash and wound.   Neurological: Negative for dizziness,  "syncope, speech difficulty, weakness, numbness and headaches.   Hematological: Negative for adenopathy. Does not bruise/bleed easily.   Psychiatric/Behavioral: Negative for confusion. The patient is not nervous/anxious.        History:  Past Medical History:   Diagnosis Date   • Hyperlipidemia    • Hypertension        Past Surgical History:   Procedure Laterality Date   • BACK SURGERY     • COLONOSCOPY      2017-Dr Rosales       Family History   Problem Relation Age of Onset   • Heart attack Maternal Aunt    • Heart disease Maternal Grandmother    • Heart disease Paternal Grandfather        Social History   Substance Use Topics   • Smoking status: Former Smoker     Years: 20.00     Types: Cigarettes     Quit date: 2007   • Smokeless tobacco: Never Used   • Alcohol use No       Allergies:  No Known Allergies    Medications:    Current Outpatient Prescriptions:   •  atenolol (TENORMIN) 25 MG tablet, TAKE ONE TABLET BY MOUTH DAILY, Disp: 90 tablet, Rfl: 3  •  atorvastatin (LIPITOR) 10 MG tablet, Take 1 tablet by mouth Every Night., Disp: 30 tablet, Rfl: 11  •  loratadine (CLARITIN) 10 MG tablet, Take 10 mg by mouth Daily., Disp: , Rfl:   •  omeprazole (priLOSEC) 40 MG capsule, TAKE ONE CAPSULE BY MOUTH DAILY, Disp: 90 capsule, Rfl: 3  •  ondansetron ODT (ZOFRAN-ODT) 4 MG disintegrating tablet, Take 1 tablet by mouth Every 8 (Eight) Hours As Needed for Nausea or Vomiting., Disp: 8 tablet, Rfl: 0    Objective     Vital Signs:   Vitals:    06/13/18 0837   BP: 130/85   Pulse: 80   Temp: 98.8 °F (37.1 °C)   TempSrc: Temporal Artery    SpO2: 99%   Weight: 78.5 kg (173 lb)   Height: 167.6 cm (65.98\")       Physical Exam:   General Appearance:    Alert, cooperative, in no acute distress   Head:    Normocephalic, without obvious abnormality, atraumatic   Eyes:            Lids and lashes normal, conjunctivae and sclerae normal, no   icterus, no pallor, corneas clear, PERRLA   Ears:    Ears appear intact with no abnormalities noted "   Throat:   No oral lesions, no thrush, oral mucosa moist   Neck:   No adenopathy, supple, trachea midline, no thyromegaly, no   carotid bruit, no JVD   Lungs:     Clear to auscultation,respirations regular, even and                  unlabored    Heart:    Regular rhythm and normal rate, normal S1 and S2, no            murmur   Abdomen:     no masses, no organomegaly, soft non-tender, non-distended, no guarding, there is evidence of bilateral inguinal hernias, reducible and tender    Extremities:   Moves all extremities well, no edema, no cyanosis, no             redness   Pulses:   Pulses palpable and equal bilaterally   Skin:   No bleeding, bruising or rash   Lymph nodes:   No palpable adenopathy   Neurologic:   Cranial nerves 2 - 12 grossly intact, sensation intact   Skin-right scrotum has some inflammation and a prominent vein.  No mass or redness.         Results Review:   I reviewed the patient's new clinical results.    Review of Systems was reviewed and confirmed as accurate today.    Assessment/Plan :    1. Left lower quadrant pain    2. Right lower quadrant abdominal pain    3. Non-recurrent bilateral inguinal hernia without obstruction or gangrene    4. Dermatitis        I had a detailed and extensive discussion with the patient in the office and they understand that they need to undergo  bilateral inguinal hernia repair with mesh.  Full risks and benefits of operative versus nonoperative intervention were discussed with the patient and these included things such as nonresolution of symptoms and possible worsening of symptoms without surgical intervention versus infection, bleeding, possible recurrent hernia, possible postoperative neuralgia from nerve damage or involvement with scar tissue, etc.  The patient understands, agrees, and had no questions for me at the end of the office visit.  Patient instructed to start cortisone cream to treat the area of dermatitis.     I discussed the patients findings  and my recommendations with patient.    Electronically signed by Fransisco Rosales MD  06/13/18        Portoins of this note have been scribed for Fransisco Rosales MD by Zenobia Paul. 6/13/2018  8:55 AM

## 2018-06-13 ENCOUNTER — OFFICE VISIT (OUTPATIENT)
Dept: SURGERY | Facility: CLINIC | Age: 48
End: 2018-06-13

## 2018-06-13 VITALS
SYSTOLIC BLOOD PRESSURE: 130 MMHG | HEIGHT: 66 IN | DIASTOLIC BLOOD PRESSURE: 85 MMHG | WEIGHT: 173 LBS | OXYGEN SATURATION: 99 % | HEART RATE: 80 BPM | BODY MASS INDEX: 27.8 KG/M2 | TEMPERATURE: 98.8 F

## 2018-06-13 DIAGNOSIS — R10.32 LEFT LOWER QUADRANT PAIN: Primary | ICD-10-CM

## 2018-06-13 DIAGNOSIS — K40.20 NON-RECURRENT BILATERAL INGUINAL HERNIA WITHOUT OBSTRUCTION OR GANGRENE: ICD-10-CM

## 2018-06-13 DIAGNOSIS — L30.9 DERMATITIS: ICD-10-CM

## 2018-06-13 DIAGNOSIS — R10.31 RIGHT LOWER QUADRANT ABDOMINAL PAIN: ICD-10-CM

## 2018-06-13 PROCEDURE — 99204 OFFICE O/P NEW MOD 45 MIN: CPT | Performed by: SURGERY

## 2018-07-03 ENCOUNTER — OUTSIDE FACILITY SERVICE (OUTPATIENT)
Dept: SURGERY | Facility: CLINIC | Age: 48
End: 2018-07-03

## 2018-07-03 PROCEDURE — 49505 PRP I/HERN INIT REDUC >5 YR: CPT | Performed by: SURGERY

## 2018-07-11 ENCOUNTER — OFFICE VISIT (OUTPATIENT)
Dept: SURGERY | Facility: CLINIC | Age: 48
End: 2018-07-11

## 2018-07-11 VITALS
SYSTOLIC BLOOD PRESSURE: 110 MMHG | OXYGEN SATURATION: 98 % | WEIGHT: 171 LBS | DIASTOLIC BLOOD PRESSURE: 84 MMHG | HEIGHT: 66 IN | HEART RATE: 70 BPM | TEMPERATURE: 97.4 F | BODY MASS INDEX: 27.48 KG/M2 | RESPIRATION RATE: 16 BRPM

## 2018-07-11 DIAGNOSIS — Z48.89 POSTOPERATIVE VISIT: Primary | ICD-10-CM

## 2018-07-11 PROCEDURE — 99024 POSTOP FOLLOW-UP VISIT: CPT | Performed by: SURGERY

## 2018-07-11 NOTE — PROGRESS NOTES
"Patient: Ulices Negron    YOB: 1970    Date: 07/11/2018    Primary Care Provider: No Known Provider    Reason for Consultation: Follow-up hernia    No chief complaint on file.      History of present illness:  I saw the patient in the office today as a followup from their recent hernia repair.  They state that they have done well and are having no complaints.    The following portions of the patient's history were reviewed and updated as appropriate: allergies, current medications, past family history, past medical history, past social history, past surgical history and problem list.    Vital Signs:   Vitals:    07/11/18 0904   BP: 110/84   BP Location: Right arm   Patient Position: Sitting   Cuff Size: Large Adult   Pulse: 70   Resp: 16   Temp: 97.4 °F (36.3 °C)   TempSrc: Temporal Artery    SpO2: 98%   Weight: 77.6 kg (171 lb)   Height: 167.6 cm (66\")       Physical Exam:   General Appearance:    Alert, cooperative, in no acute distress   Abdomen:     no masses, no organomegaly, soft non-tender, non-distended, no guarding, wounds are well healed, no evidence of recurrent hernia         Assessment / Plan:    1. Postoperative visit        I did discuss the situation with the patient today in the office and they have done well from their recent herniorraphy.Patient will be released back to work in 3 weeks.  I have released the patient back to normal activity, they understand that they need to be careful about heavy lifting.  I need to see the patient back in the office only if they are having further problems, they know to call me if they are.    Electronically signed by Fransisco Rosales MD  07/11/18                "

## 2020-01-08 ENCOUNTER — OFFICE VISIT (OUTPATIENT)
Dept: INTERNAL MEDICINE | Facility: CLINIC | Age: 50
End: 2020-01-08

## 2020-01-08 VITALS
DIASTOLIC BLOOD PRESSURE: 90 MMHG | TEMPERATURE: 98 F | SYSTOLIC BLOOD PRESSURE: 120 MMHG | WEIGHT: 179 LBS | BODY MASS INDEX: 28.77 KG/M2 | HEIGHT: 66 IN | OXYGEN SATURATION: 98 % | HEART RATE: 63 BPM

## 2020-01-08 DIAGNOSIS — K52.9 COLITIS: ICD-10-CM

## 2020-01-08 DIAGNOSIS — E66.3 OVERWEIGHT (BMI 25.0-29.9): ICD-10-CM

## 2020-01-08 DIAGNOSIS — Z13.220 LIPID SCREENING: ICD-10-CM

## 2020-01-08 DIAGNOSIS — K62.5 RECTAL BLEEDING: Primary | ICD-10-CM

## 2020-01-08 LAB
CHOLEST SERPL-MCNC: 203 MG/DL (ref 0–200)
HDLC SERPL-MCNC: 53 MG/DL (ref 40–60)
LDLC SERPL CALC-MCNC: 134 MG/DL (ref 0–100)
TRIGL SERPL-MCNC: 78 MG/DL (ref 0–150)
VLDLC SERPL CALC-MCNC: 15.6 MG/DL

## 2020-01-08 PROCEDURE — 99214 OFFICE O/P EST MOD 30 MIN: CPT | Performed by: FAMILY MEDICINE

## 2020-01-08 NOTE — PATIENT INSTRUCTIONS
Mediterranean Diet  A Mediterranean diet refers to food and lifestyle choices that are based on the traditions of countries located on the [place]. This way of eating has been shown to help prevent certain conditions and improve outcomes for people who have chronic diseases, like kidney disease and heart disease.  What are tips for following this plan?  Lifestyle  · Cook and eat meals together with your family, when possible.  · Drink enough fluid to keep your urine clear or pale yellow.  · Be physically active every day. This includes:  ? Aerobic exercise like running or swimming.  ? Leisure activities like gardening, walking, or housework.  · Get 7-8 hours of sleep each night.  · If recommended by your health care provider, drink red wine in moderation. This means 1 glass a day for nonpregnant women and 2 glasses a day for men. A glass of wine equals 5 oz (150 mL).  Reading food labels    · Check the serving size of packaged foods. For foods such as rice and pasta, the serving size refers to the amount of cooked product, not dry.  · Check the total fat in packaged foods. Avoid foods that have saturated fat or trans fats.  · Check the ingredients list for added sugars, such as corn syrup.  Shopping  · At the grocery store, buy most of your food from the areas near the walls of the store. This includes:  ? Fresh fruits and vegetables (produce).  ? Grains, beans, nuts, and seeds. Some of these may be available in unpackaged forms or large amounts (in bulk).  ? Fresh seafood.  ? Poultry and eggs.  ? Low-fat dairy products.  · Buy whole ingredients instead of prepackaged foods.  · Buy fresh fruits and vegetables in-season from local farmers markets.  · Buy frozen fruits and vegetables in resealable bags.  · If you do not have access to quality fresh seafood, buy precooked frozen shrimp or canned fish, such as tuna, salmon, or sardines.  · Buy small amounts of raw or cooked vegetables, salads, or olives from  the deli or salad bar at your store.  · Stock your pantry so you always have certain foods on hand, such as olive oil, canned tuna, canned tomatoes, rice, pasta, and beans.  Cooking  · Cook foods with extra-virgin olive oil instead of using butter or other vegetable oils.  · Have meat as a side dish, and have vegetables or grains as your main dish. This means having meat in small portions or adding small amounts of meat to foods like pasta or stew.  · Use beans or vegetables instead of meat in common dishes like chili or lasagna.  · [place] with different cooking methods. Try roasting or broiling vegetables instead of steaming or sautéeing them.  · Add frozen vegetables to soups, stews, pasta, or rice.  · Add nuts or seeds for added healthy fat at each meal. You can add these to yogurt, salads, or vegetable dishes.  · Marinate fish or vegetables using olive oil, lemon juice, garlic, and fresh herbs.  Meal planning    · Plan to eat 1 vegetarian meal one day each week. Try to work up to 2 vegetarian meals, if possible.  · Eat seafood 2 or more times a week.  · Have healthy snacks readily available, such as:  ? Vegetable sticks with hummus.  ? Greek yogurt.  ? Fruit and nut trail mix.  · Eat balanced meals throughout the week. This includes:  ? Fruit: 2-3 servings a day  ? Vegetables: 4-5 servings a day  ? Low-fat dairy: 2 servings a day  ? Fish, poultry, or lean meat: 1 serving a day  ? Beans and legumes: 2 or more servings a week  ? Nuts and seeds: 1-2 servings a day  ? Whole grains: 6-8 servings a day  ? Extra-virgin olive oil: 3-4 servings a day  · Limit red meat and sweets to only a few servings a month  What are my food choices?  · Mediterranean diet  ? Recommended  ? Grains: Whole-grain pasta. Brown rice. Bulgar wheat. Polenta. Couscous. Whole-wheat bread. Oatmeal. Quinoa.  ? Vegetables: Artichokes. Beets. Broccoli. Cabbage. Carrots. Eggplant. Green beans. Chard. Kale. Spinach. Onions. Leeks. Peas. Squash.  Tomatoes. Peppers. Radishes.  ? Fruits: Apples. Apricots. Avocado. Berries. Bananas. Cherries. Dates. Figs. Grapes. [place]. Melon. Oranges. Peaches. Plums. Pomegranate.  ? Meats and other protein foods: Beans. Almonds. Sunflower seeds. Pine nuts. Peanuts. Cod. Mimbres. Scallops. Shrimp. Tuna. Tilapia. Clams. Oysters. Eggs.  ? Dairy: Low-fat milk. Cheese. Greek yogurt.  ? Beverages: Water. Red wine. Herbal tea.  ? Fats and oils: Extra virgin olive oil. Avocado oil. Grape seed oil.  ? Sweets and desserts: Greek yogurt with honey. Baked apples. Poached pears. Trail mix.  ? Seasoning and other foods: Basil. Cilantro. Coriander. Cumin. Mint. Parsley. [place]. Rosemary. Tarragon. Garlic. Oregano. Thyme. Pepper. Balsalmic vinegar. Tahini. Hummus. Tomato sauce. Olives. Mushrooms.  ? Limit these  ? Grains: Prepackaged pasta or rice dishes. Prepackaged cereal with added sugar.  ? Vegetables: Deep fried potatoes (french fries).  ? Fruits: Fruit canned in syrup.  ? Meats and other protein foods: Beef. Pork. Lamb. Poultry with skin. Hot dogs. [place].  ? Dairy: Ice cream. Sour cream. Whole milk.  ? Beverages: Juice. Sugar-sweetened soft drinks. Beer. Liquor and spirits.  ? Fats and oils: Butter. Canola oil. Vegetable oil. Beef fat (tallow). Lard.  ? Sweets and desserts: Cookies. Cakes. Pies. Candy.  ? Seasoning and other foods: Mayonnaise. Premade sauces and marinades.  ? The items listed may not be a complete list. Talk with your dietitian about what dietary choices are right for you.  Summary  · The Mediterranean diet includes both food and lifestyle choices.  · Eat a variety of fresh fruits and vegetables, beans, nuts, seeds, and whole grains.  · Limit the amount of red meat and sweets that you eat.  · Talk with your health care provider about whether it is safe for you to drink red wine in moderation. This means 1 glass a day for nonpregnant women and 2 glasses a day for men. A glass of wine equals 5 oz (150 mL).  This information  "is not intended to replace advice given to you by your health care provider. Make sure you discuss any questions you have with your health care provider.  Document Released: 08/10/2017 Document Revised: 09/12/2017 Document Reviewed: 08/10/2017  [employer] Interactive Patient Education © 2019 [employer]    Carbohydrate Counting for Diabetes Mellitus, Adult    Carbohydrate counting is a method of keeping track of how many carbohydrates you eat. Eating carbohydrates naturally increases the amount of sugar (glucose) in the blood. Counting how many carbohydrates you eat helps keep your blood glucose within normal limits, which helps you manage your diabetes (diabetes mellitus).  It is important to know how many carbohydrates you can safely have in each meal. This is different for every person. A diet and nutrition specialist (registered dietitian) can help you make a meal plan and calculate how many carbohydrates you should have at each meal and snack.  Carbohydrates are found in the following foods:  · Grains, such as breads and cereals.  · Dried beans and soy products.  · Starchy vegetables, such as potatoes, peas, and corn.  · Fruit and fruit juices.  · Milk and yogurt.  · Sweets and snack foods, such as cake, cookies, candy, chips, and soft drinks.  How do I count carbohydrates?  There are two ways to count carbohydrates in food. You can use either of the methods or a combination of both.  Reading \"Nutrition Facts\" on packaged food  The \"Nutrition Facts\" list is included on the labels of almost all packaged foods and beverages in the [place] It includes:  · The serving size.  · Information about nutrients in each serving, including the grams (g) of carbohydrate per serving.  To use the “Nutrition Facts\":  · Decide how many servings you will have.  · Multiply the number of servings by the number of carbohydrates per serving.  · The resulting number is the total amount of carbohydrates that you will be having.  Learning " "standard serving sizes of other foods  When you eat carbohydrate foods that are not packaged or do not include \"Nutrition Facts\" on the label, you need to measure the servings in order to count the amount of carbohydrates:  · Measure the foods that you will eat with a food scale or measuring cup, if needed.  · Decide how many standard-size servings you will eat.  · Multiply the number of servings by 15. Most carbohydrate-rich foods have about 15 g of carbohydrates per serving.  ? For example, if you eat 8 oz (170 g) of strawberries, you will have eaten 2 servings and 30 g of carbohydrates (2 servings x 15 g = 30 g).  · For foods that have more than one food mixed, such as soups and casseroles, you must count the carbohydrates in each food that is included.  The following list contains standard serving sizes of common carbohydrate-rich foods. Each of these servings has about 15 g of carbohydrates:  · ½ hamburger bun or ½ English muffin.  · ½ oz (15 mL) syrup.  · ½ oz (14 g) jelly.  · 1 slice of bread.  · 1 six-inch tortilla.  · 3 oz (85 g) cooked rice or pasta.  · 4 oz (113 g) cooked dried beans.  · 4 oz (113 g) starchy vegetable, such as peas, corn, or potatoes.  · 4 oz (113 g) hot cereal.  · 4 oz (113 g) mashed potatoes or ¼ of a large baked potato.  · 4 oz (113 g) canned or frozen fruit.  · 4 oz (120 mL) fruit juice.  · 4-6 crackers.  · 6 chicken nuggets.  · 6 oz (170 g) unsweetened dry cereal.  · 6 oz (170 g) plain fat-free yogurt or yogurt sweetened with artificial sweeteners.  · 8 oz (240 mL) milk.  · 8 oz (170 g) fresh fruit or one small piece of fruit.  · 24 oz (680 g) popped popcorn.  Example of carbohydrate counting  Sample meal  · 3 oz (85 g) chicken breast.  · 6 oz (170 g) brown rice.  · 4 oz (113 g) corn.  · 8 oz (240 mL) milk.  · 8 oz (170 g) strawberries with sugar-free whipped topping.  Carbohydrate calculation  1. Identify the foods that contain " carbohydrates:  ? Rice.  ? Corn.  ? Milk.  ? Strawberries.  2. Calculate how many servings you have of each food:  ? 2 servings rice.  ? 1 serving corn.  ? 1 serving milk.  ? 1 serving strawberries.  3. Multiply each number of servings by 15 g:  ? 2 servings rice x 15 g = 30 g.  ? 1 serving corn x 15 g = 15 g.  ? 1 serving milk x 15 g = 15 g.  ? 1 serving strawberries x 15 g = 15 g.  4. Add together all of the amounts to find the total grams of carbohydrates eaten:  ? 30 g + 15 g + 15 g + 15 g = 75 g of carbohydrates total.  Summary  · Carbohydrate counting is a method of keeping track of how many carbohydrates you eat.  · Eating carbohydrates naturally increases the amount of sugar (glucose) in the blood.  · Counting how many carbohydrates you eat helps keep your blood glucose within normal limits, which helps you manage your diabetes.  · A diet and nutrition specialist (registered dietitian) can help you make a meal plan and calculate how many carbohydrates you should have at each meal and snack.  This information is not intended to replace advice given to you by your health care provider. Make sure you discuss any questions you have with your health care provider.  Document Released: 12/18/2006 Document Revised: 06/27/2018 Document Reviewed: 05/31/2017  [employer] Interactive Patient Education © 2019 [employer]

## 2020-01-08 NOTE — PROGRESS NOTES
"Ulices Negron is a 49 y.o. male.    Chief Complaint   Patient presents with   • Rectal Bleeding       HPI   Patient reports bright red blood per rectum for 1 month. He does admit to hemorrhoids.  He reports anal itching.  He reports bleeding is noticed in the toilet as well.   He has had a colonoscopy and was diagnosed with colitis.  He was treated with steroid enemas for 2 weeks.  Patient denies any abdominal pain, diarrhea, constipation.    Patient is aware of being overweight.  He is wanting to start a diet program to help with weight loss.  He is not currently following any sort of diet regimen.  He is not rigorously exercising.    The following portions of the patient's history were reviewed and updated as appropriate: allergies, current medications, past family history, past medical history, past social history, past surgical history and problem list.     No Known Allergies      Current Outpatient Medications:   •  hydrocortisone (ANUSOL-HC) 2.5 % rectal cream, Insert  into the rectum 2 (Two) Times a Day., Disp: 28.35 g, Rfl: 2    ROS    Review of Systems   Constitutional: Negative for chills, fatigue and fever.   HENT: Positive for rhinorrhea. Negative for congestion, postnasal drip and sore throat.    Respiratory: Negative for cough and shortness of breath.    Cardiovascular: Negative for chest pain.   Gastrointestinal: Positive for anal bleeding and blood in stool. Negative for abdominal pain, constipation, diarrhea, nausea and vomiting.   Musculoskeletal: Negative for arthralgias and back pain.   Neurological: Positive for headache. Negative for weakness.   Psychiatric/Behavioral: Negative for depressed mood. The patient is nervous/anxious.         Mood swings       Vitals:    01/08/20 0834   BP: 120/90   BP Location: Left arm   Patient Position: Sitting   Cuff Size: Adult   Pulse: 63   Temp: 98 °F (36.7 °C)   TempSrc: Temporal   SpO2: 98%   Weight: 81.2 kg (179 lb)   Height: 167.6 cm (66\")     Body " mass index is 28.89 kg/m².    Physical Exam     Physical Exam   Constitutional: He is oriented to person, place, and time. He appears well-developed and well-nourished. No distress.   HENT:   Head: Normocephalic and atraumatic.   Right Ear: External ear normal.   Left Ear: External ear normal.   Mouth/Throat: Oropharynx is clear and moist.   Eyes: Pupils are equal, round, and reactive to light. Conjunctivae and EOM are normal.   Neck: Normal range of motion. Neck supple.   Cardiovascular: Normal rate and regular rhythm.   No murmur heard.  Pulmonary/Chest: Effort normal and breath sounds normal. No respiratory distress. He has no wheezes.   Abdominal: Soft. Bowel sounds are normal. He exhibits no distension. There is no tenderness.   Musculoskeletal: Normal range of motion. He exhibits no edema.   Lymphadenopathy:     He has no cervical adenopathy.   Neurological: He is alert and oriented to person, place, and time. No cranial nerve deficit.   Skin: Skin is warm and dry.   Psychiatric: He has a normal mood and affect. His behavior is normal.       Assessment/Plan    Problem List Items Addressed This Visit        Digestive    Rectal bleeding - Primary    Relevant Orders    CBC & Differential    Comprehensive Metabolic Panel    Inflammatory Bowel Disease Panel    Colitis    Relevant Orders    CBC & Differential    Comprehensive Metabolic Panel    Inflammatory Bowel Disease Panel       Other    Overweight (BMI 25.0-29.9)    Relevant Orders    Comprehensive Metabolic Panel      Other Visit Diagnoses     Lipid screening        Relevant Orders    Lipid Panel        Patient has been diagnosed with colitis in the past.  Due to recurrence of rectal bleeding, there is some concern for possible inflammatory bowel disease.  Will obtain inflammatory bowel panel along with other blood work for further evaluation.  He is being treated for an external hemorrhoid as well, which may be the culprit of his rectal bleeding.  He is to  use hydrocortisone 2.5% rectal cream twice a day.  Also discussed with the patient diet regimen and guidelines for low-carb and or Mediterranean diet.    New Medications Ordered This Visit   Medications   • hydrocortisone (ANUSOL-HC) 2.5 % rectal cream     Sig: Insert  into the rectum 2 (Two) Times a Day.     Dispense:  28.35 g     Refill:  2       No orders of the defined types were placed in this encounter.      Return in about 1 month (around 2/8/2020) for blood pressure check, weight gain, rectal bleeding.    Carmen Benitez, DO

## 2020-01-09 LAB
ALBUMIN SERPL-MCNC: 4.5 G/DL (ref 3.5–5.2)
ALBUMIN/GLOB SERPL: 1.5 G/DL
ALP SERPL-CCNC: 115 U/L (ref 39–117)
ALT SERPL-CCNC: 32 U/L (ref 1–41)
AST SERPL-CCNC: 19 U/L (ref 1–40)
BAKER'S YEAST IGA QN: 75.6 UNITS (ref 0–24.9)
BAKER'S YEAST IGG QN: 41.4 UNITS (ref 0–24.9)
BASOPHILS # BLD AUTO: 0.08 10*3/MM3 (ref 0–0.2)
BASOPHILS NFR BLD AUTO: 0.9 % (ref 0–1.5)
BILIRUB SERPL-MCNC: 0.6 MG/DL (ref 0.2–1.2)
BUN SERPL-MCNC: 11 MG/DL (ref 6–20)
BUN/CREAT SERPL: 10.3 (ref 7–25)
CALCIUM SERPL-MCNC: 9.4 MG/DL (ref 8.6–10.5)
CHLORIDE SERPL-SCNC: 99 MMOL/L (ref 98–107)
CO2 SERPL-SCNC: 27 MMOL/L (ref 22–29)
CREAT SERPL-MCNC: 1.07 MG/DL (ref 0.76–1.27)
EOSINOPHIL # BLD AUTO: 0.41 10*3/MM3 (ref 0–0.4)
EOSINOPHIL NFR BLD AUTO: 4.8 % (ref 0.3–6.2)
ERYTHROCYTE [DISTWIDTH] IN BLOOD BY AUTOMATED COUNT: 12.8 % (ref 12.3–15.4)
GLOBULIN SER CALC-MCNC: 3 GM/DL
GLUCOSE SERPL-MCNC: 202 MG/DL (ref 65–99)
HCT VFR BLD AUTO: 45.8 % (ref 37.5–51)
HGB BLD-MCNC: 15.8 G/DL (ref 13–17.7)
IMM GRANULOCYTES # BLD AUTO: 0.02 10*3/MM3 (ref 0–0.05)
IMM GRANULOCYTES NFR BLD AUTO: 0.2 % (ref 0–0.5)
LYMPHOCYTES # BLD AUTO: 2.54 10*3/MM3 (ref 0.7–3.1)
LYMPHOCYTES NFR BLD AUTO: 30 % (ref 19.6–45.3)
MCH RBC QN AUTO: 30.6 PG (ref 26.6–33)
MCHC RBC AUTO-ENTMCNC: 34.5 G/DL (ref 31.5–35.7)
MCV RBC AUTO: 88.6 FL (ref 79–97)
MONOCYTES # BLD AUTO: 0.59 10*3/MM3 (ref 0.1–0.9)
MONOCYTES NFR BLD AUTO: 7 % (ref 5–12)
NEUTROPHILS # BLD AUTO: 4.83 10*3/MM3 (ref 1.7–7)
NEUTROPHILS NFR BLD AUTO: 57.1 % (ref 42.7–76)
NRBC BLD AUTO-RTO: 0 /100 WBC (ref 0–0.2)
P-ANCA ATYPICAL TITR SER IF: ABNORMAL TITER
PLATELET # BLD AUTO: 345 10*3/MM3 (ref 140–450)
POTASSIUM SERPL-SCNC: 4.9 MMOL/L (ref 3.5–5.2)
PROT SERPL-MCNC: 7.5 G/DL (ref 6–8.5)
RBC # BLD AUTO: 5.17 10*6/MM3 (ref 4.14–5.8)
SODIUM SERPL-SCNC: 140 MMOL/L (ref 136–145)
WBC # BLD AUTO: 8.47 10*3/MM3 (ref 3.4–10.8)

## 2020-01-16 DIAGNOSIS — K62.5 RECTAL BLEEDING: ICD-10-CM

## 2020-01-16 DIAGNOSIS — K52.9 COLITIS: Primary | ICD-10-CM

## 2020-01-16 DIAGNOSIS — R76.8 ELEVATED ANTI-SACCHAROMYCES CEREVISIAE ANTIBODY: ICD-10-CM

## 2020-01-17 ENCOUNTER — OFFICE VISIT (OUTPATIENT)
Dept: INTERNAL MEDICINE | Facility: CLINIC | Age: 50
End: 2020-01-17

## 2020-01-17 VITALS
WEIGHT: 183.4 LBS | HEART RATE: 62 BPM | HEIGHT: 66 IN | TEMPERATURE: 98.2 F | DIASTOLIC BLOOD PRESSURE: 92 MMHG | OXYGEN SATURATION: 98 % | BODY MASS INDEX: 29.47 KG/M2 | SYSTOLIC BLOOD PRESSURE: 118 MMHG

## 2020-01-17 DIAGNOSIS — E11.9 TYPE 2 DIABETES MELLITUS WITHOUT COMPLICATION, WITHOUT LONG-TERM CURRENT USE OF INSULIN (HCC): Primary | ICD-10-CM

## 2020-01-17 DIAGNOSIS — R73.9 HIGH BLOOD SUGAR: ICD-10-CM

## 2020-01-17 LAB — HBA1C MFR BLD: 7.8 %

## 2020-01-17 PROCEDURE — 83036 HEMOGLOBIN GLYCOSYLATED A1C: CPT | Performed by: FAMILY MEDICINE

## 2020-01-17 PROCEDURE — 99213 OFFICE O/P EST LOW 20 MIN: CPT | Performed by: FAMILY MEDICINE

## 2020-01-17 RX ORDER — BLOOD-GLUCOSE METER
1 KIT MISCELLANEOUS DAILY
Qty: 1 EACH | Refills: 0 | Status: SHIPPED | OUTPATIENT
Start: 2020-01-17 | End: 2020-02-12

## 2020-01-17 RX ORDER — LANCETS
1 EACH MISCELLANEOUS DAILY
Qty: 100 EACH | Refills: 5 | Status: SHIPPED | OUTPATIENT
Start: 2020-01-17

## 2020-01-17 NOTE — PROGRESS NOTES
"Ulices Negron is a 49 y.o. male.    Chief Complaint   Patient presents with   • Blood Sugar Problem       HPI   Patient is newly diagnosed diabetic.  He has had an elevated random glucose above 200.   A1C is 7.8 today in the office.  He is not currently on any routine medication.  He does not follow any certain diet, but was planning on initiating a diet and exercise regimen.      The following portions of the patient's history were reviewed and updated as appropriate: allergies, current medications, past family history, past medical history, past social history, past surgical history and problem list.     No Known Allergies      Current Outpatient Medications:   •  hydrocortisone (ANUSOL-HC) 2.5 % rectal cream, Insert  into the rectum 2 (Two) Times a Day., Disp: 28.35 g, Rfl: 2  •  glucose blood test strip, Use as instructed; E.11.9, Disp: 100 each, Rfl: 5  •  glucose monitor monitoring kit, 1 each Daily. E11.9, Disp: 1 each, Rfl: 0  •  ONE TOUCH CLUB LANCETS misc, 1 each Daily. E11.9, Disp: 100 each, Rfl: 5  •  SITagliptin (JANUVIA) 50 MG tablet, Take 1 tablet by mouth Daily., Disp: 30 tablet, Rfl: 5    ROS    Review of Systems   Respiratory: Negative for shortness of breath.    Cardiovascular: Negative for chest pain.   Gastrointestinal: Positive for blood in stool. Negative for abdominal pain, diarrhea, nausea and vomiting.   Endocrine: Negative for polydipsia, polyphagia and polyuria.       Vitals:    01/17/20 1103   BP: 118/92   BP Location: Left arm   Patient Position: Sitting   Cuff Size: Adult   Pulse: 62   Temp: 98.2 °F (36.8 °C)   TempSrc: Temporal   SpO2: 98%   Weight: 83.2 kg (183 lb 6.4 oz)   Height: 167.6 cm (66\")     Body mass index is 29.6 kg/m².    Physical Exam     Physical Exam   Constitutional: He is oriented to person, place, and time. He appears well-developed and well-nourished. No distress.   HENT:   Head: Normocephalic and atraumatic.   Right Ear: External ear normal.   Left Ear: " External ear normal.   Eyes: Conjunctivae and EOM are normal.   Cardiovascular: Normal rate and regular rhythm.   Pulmonary/Chest: Effort normal. No respiratory distress.   Neurological: He is alert and oriented to person, place, and time. No cranial nerve deficit.   Skin: Skin is warm and dry.   Psychiatric: He has a normal mood and affect. His behavior is normal.       Assessment/Plan    Problem List Items Addressed This Visit        Endocrine    Type 2 diabetes mellitus without complication, without long-term current use of insulin (CMS/[facility]) - Primary     Discussed new diagnosis with patient today.  He has been given some reading material on diabetes.  Discussed following diabetic diet.  Advised to have annual eye exams.  He does have GI issues already.  Will avoid metformin and start januvia.  Encouraged to monitor glucose readings and bring in to f/u in 1 month.           Relevant Medications    SITagliptin (JANUVIA) 50 MG tablet      Other Visit Diagnoses     High blood sugar        Relevant Orders    POC Glycosylated Hemoglobin (Hb A1C) (Completed)          New Medications Ordered This Visit   Medications   • SITagliptin (JANUVIA) 50 MG tablet     Sig: Take 1 tablet by mouth Daily.     Dispense:  30 tablet     Refill:  5   • glucose blood test strip     Sig: Use as instructed; E.11.9     Dispense:  100 each     Refill:  5   • ONE TOUCH CLUB LANCETS misc     Si each Daily. E11.9     Dispense:  100 each     Refill:  5   • glucose monitor monitoring kit     Si each Daily. E11.9     Dispense:  1 each     Refill:  0       No orders of the defined types were placed in this encounter.      Return in about 1 month (around 2020) for diabetes.    Carmen Benitez DO

## 2020-01-26 PROBLEM — E11.9 TYPE 2 DIABETES MELLITUS WITHOUT COMPLICATION, WITHOUT LONG-TERM CURRENT USE OF INSULIN: Status: ACTIVE | Noted: 2020-01-26

## 2020-01-26 NOTE — ASSESSMENT & PLAN NOTE
Discussed new diagnosis with patient today.  He has been given some reading material on diabetes.  Discussed following diabetic diet.  Advised to have annual eye exams.  He does have GI issues already.  Will avoid metformin and start januvia.  Encouraged to monitor glucose readings and bring in to f/u in 1 month.

## 2020-02-10 ENCOUNTER — OFFICE VISIT (OUTPATIENT)
Dept: INTERNAL MEDICINE | Facility: CLINIC | Age: 50
End: 2020-02-10

## 2020-02-10 VITALS
WEIGHT: 174.5 LBS | HEART RATE: 71 BPM | TEMPERATURE: 98.2 F | HEIGHT: 66 IN | BODY MASS INDEX: 28.04 KG/M2 | DIASTOLIC BLOOD PRESSURE: 86 MMHG | SYSTOLIC BLOOD PRESSURE: 115 MMHG | OXYGEN SATURATION: 97 %

## 2020-02-10 DIAGNOSIS — H53.9 VISUAL DISTURBANCE: ICD-10-CM

## 2020-02-10 DIAGNOSIS — E78.5 HYPERLIPIDEMIA LDL GOAL <100: ICD-10-CM

## 2020-02-10 DIAGNOSIS — E11.9 TYPE 2 DIABETES MELLITUS WITHOUT COMPLICATION, WITHOUT LONG-TERM CURRENT USE OF INSULIN (HCC): Primary | ICD-10-CM

## 2020-02-10 PROCEDURE — 99214 OFFICE O/P EST MOD 30 MIN: CPT | Performed by: FAMILY MEDICINE

## 2020-02-10 RX ORDER — PRAVASTATIN SODIUM 40 MG
40 TABLET ORAL NIGHTLY
Qty: 30 TABLET | Refills: 5 | Status: SHIPPED | OUTPATIENT
Start: 2020-02-10 | End: 2021-04-20 | Stop reason: SDUPTHER

## 2020-02-10 NOTE — PROGRESS NOTES
Ulices Negron is a 49 y.o. male.    Chief Complaint   Patient presents with   • Diabetes       HPI   Patient was diagnosed with diabetes in the last couple of months. He has been compliant with the medications and denies any side effects from it. He has been monitoring fingersticks.  his fingerstick range is between 100-150.  He has not had hypoglycemic symptoms that he is aware. He has been following a diabetic diet and has been active and exercising.  He has lost 9 lbs since his lats appt.  his last eye exam was less than a year ago .     Patient did have lipid panel done a few weeks ago as well.  Cholesterol was not severely elevated, but LDL was above 100.  He has never taken cholesterol-lowering medication in the past.  Patient has been complying with a diabetic diet and eating more lean proteins here lately.  As above, he is exercising regularly.    Patient reports over the last few years he has had a couple of episodes where he develops the a kaleidoscope in his visual field that gradually gets larger then turns into that appeared in the peripheral vision rather than the middle. He reports headache with it once.  He reports it happened twice last week.      The following portions of the patient's history were reviewed and updated as appropriate: allergies, current medications, past family history, past medical history, past social history, past surgical history and problem list.     No Known Allergies      Current Outpatient Medications:   •  glucose blood test strip, Use as instructed; E.11.9, Disp: 100 each, Rfl: 5  •  glucose monitor monitoring kit, 1 each Daily. E11.9, Disp: 1 each, Rfl: 0  •  hydrocortisone (ANUSOL-HC) 2.5 % rectal cream, Insert  into the rectum 2 (Two) Times a Day., Disp: 28.35 g, Rfl: 2  •  ONE TOUCH CLUB LANCETS misc, 1 each Daily. E11.9, Disp: 100 each, Rfl: 5  •  SITagliptin (JANUVIA) 50 MG tablet, Take 1 tablet by mouth Daily., Disp: 30 tablet, Rfl: 5  •  pravastatin  "(PRAVACHOL) 40 MG tablet, Take 1 tablet by mouth Every Night., Disp: 30 tablet, Rfl: 5    ROS    Review of Systems   Constitutional: Positive for fatigue. Negative for chills and fever.   HENT: Positive for rhinorrhea. Negative for congestion.    Eyes: Positive for visual disturbance.   Respiratory: Negative for cough and shortness of breath.    Cardiovascular: Negative for chest pain.   Gastrointestinal: Negative for abdominal pain, diarrhea, nausea and vomiting.   Allergic/Immunologic: Positive for environmental allergies.       Vitals:    02/10/20 0819   BP: 115/86   BP Location: Left arm   Patient Position: Sitting   Cuff Size: Adult   Pulse: 71   Temp: 98.2 °F (36.8 °C)   TempSrc: Temporal   SpO2: 97%   Weight: 79.2 kg (174 lb 8 oz)   Height: 167.6 cm (66\")     Body mass index is 28.17 kg/m².    Physical Exam     Physical Exam   Constitutional: He is oriented to person, place, and time. He appears well-developed and well-nourished. No distress.   HENT:   Head: Normocephalic and atraumatic.   Right Ear: External ear normal.   Left Ear: External ear normal.   Eyes: Conjunctivae and EOM are normal.   Cardiovascular: Normal rate and regular rhythm.   No murmur heard.  Pulmonary/Chest: Effort normal and breath sounds normal. No respiratory distress. He has no wheezes.   Abdominal: Soft. Bowel sounds are normal. He exhibits no distension. There is no tenderness.   Musculoskeletal: He exhibits no edema.   Neurological: He is alert and oriented to person, place, and time. No cranial nerve deficit.   Skin: Skin is warm and dry.   Psychiatric: He has a normal mood and affect. His behavior is normal.       Assessment/Plan    Problem List Items Addressed This Visit        Cardiovascular and Mediastinum    Hyperlipidemia LDL goal <100     Discussed with patient his goal LDL is less than 100.  Will start pravastatin.  Discussed potential side effects of the medication.  Will monitor lipid panel every few months.         " Relevant Medications    pravastatin (PRAVACHOL) 40 MG tablet       Endocrine    Type 2 diabetes mellitus without complication, without long-term current use of insulin (CMS/[facility]) - Primary     Appears to be controlled based on patient's home readings.  Will continue Januvia.  Will monitor A1c and microalbumin every few months.  He is being started on a statin today.            Other    Visual disturbance     Patient has been encouraged to check his glucose level whenever he has these episodes.  He is also been encouraged to notify his eye doctor of his symptoms as well.               New Medications Ordered This Visit   Medications   • pravastatin (PRAVACHOL) 40 MG tablet     Sig: Take 1 tablet by mouth Every Night.     Dispense:  30 tablet     Refill:  5       No orders of the defined types were placed in this encounter.      Return in about 2 months (around 4/10/2020) for diabetes.    Carmen Benitez,

## 2020-02-10 NOTE — ASSESSMENT & PLAN NOTE
Patient has been encouraged to check his glucose level whenever he has these episodes.  He is also been encouraged to notify his eye doctor of his symptoms as well.

## 2020-02-10 NOTE — ASSESSMENT & PLAN NOTE
Appears to be controlled based on patient's home readings.  Will continue Januvia.  Will monitor A1c and microalbumin every few months.  He is being started on a statin today.

## 2020-02-10 NOTE — ASSESSMENT & PLAN NOTE
Discussed with patient his goal LDL is less than 100.  Will start pravastatin.  Discussed potential side effects of the medication.  Will monitor lipid panel every few months.

## 2020-02-12 RX ORDER — BLOOD-GLUCOSE METER
EACH MISCELLANEOUS
Qty: 1 EACH | Refills: 0 | Status: SHIPPED | OUTPATIENT
Start: 2020-02-12

## 2020-03-11 ENCOUNTER — TELEPHONE (OUTPATIENT)
Dept: SURGERY | Facility: CLINIC | Age: 50
End: 2020-03-11

## 2020-07-13 RX ORDER — SITAGLIPTIN 50 MG/1
TABLET, FILM COATED ORAL
Qty: 30 TABLET | Refills: 4 | Status: SHIPPED | OUTPATIENT
Start: 2020-07-13 | End: 2020-12-12

## 2020-07-22 ENCOUNTER — OFFICE VISIT (OUTPATIENT)
Dept: GASTROENTEROLOGY | Facility: CLINIC | Age: 50
End: 2020-07-22

## 2020-07-22 VITALS
WEIGHT: 174 LBS | TEMPERATURE: 97.7 F | BODY MASS INDEX: 27.97 KG/M2 | DIASTOLIC BLOOD PRESSURE: 98 MMHG | SYSTOLIC BLOOD PRESSURE: 154 MMHG | HEIGHT: 66 IN | HEART RATE: 68 BPM

## 2020-07-22 DIAGNOSIS — R19.7 BLOODY DIARRHEA: ICD-10-CM

## 2020-07-22 DIAGNOSIS — Z01.812 PRE-PROCEDURE LAB EXAM: Primary | ICD-10-CM

## 2020-07-22 DIAGNOSIS — K51.30 ULCERATIVE PROCTOCOLITIS (HCC): Primary | ICD-10-CM

## 2020-07-22 DIAGNOSIS — Z12.11 ENCOUNTER FOR SCREENING FOR MALIGNANT NEOPLASM OF COLON: ICD-10-CM

## 2020-07-22 DIAGNOSIS — E73.9 LACTOSE INTOLERANCE: ICD-10-CM

## 2020-07-22 PROCEDURE — 99204 OFFICE O/P NEW MOD 45 MIN: CPT | Performed by: INTERNAL MEDICINE

## 2020-07-22 RX ORDER — CETIRIZINE HYDROCHLORIDE 10 MG/1
10 TABLET ORAL DAILY
COMMUNITY

## 2020-07-22 RX ORDER — SODIUM CHLORIDE 9 MG/ML
70 INJECTION, SOLUTION INTRAVENOUS CONTINUOUS PRN
Status: CANCELLED | OUTPATIENT
Start: 2020-07-30

## 2020-07-22 RX ORDER — BISACODYL 5 MG/1
20 TABLET, DELAYED RELEASE ORAL ONCE
Qty: 4 TABLET | Refills: 0 | Status: SHIPPED | OUTPATIENT
Start: 2020-07-22 | End: 2020-07-22

## 2020-07-22 RX ORDER — POLYETHYLENE GLYCOL 3350 17 G/17G
238 POWDER, FOR SOLUTION ORAL ONCE
Qty: 238 G | Refills: 0 | Status: SHIPPED | OUTPATIENT
Start: 2020-07-22 | End: 2020-07-22

## 2020-07-22 NOTE — PROGRESS NOTES
New Patient Consult      Date: 2020   Patient Name: Ulices Negron  MRN: [id number]  : 1970     Referring Physician: Carmen Benitez DO    Chief Complaint   Patient presents with   • new patient     consult colitis   • Rectal Bleeding       History of Present Illness: Ulices Negron is a 50 y.o. male who is here today to establish care with Gastroenterology for evaluation of rectal bleeding.   The patient has history of bright red blood per rectum on and off for the last 2 months.  This is described as mild to moderate, in wipes and in the stool almost daily now.  He will have atleast 6-7 times loose stool daily.  No associated abdominal pain. The patient denies anorectal pain. Deny any use of NSAID or blood thinners except occasional aleeve. He has associated bloating and tenesmus    Pt denies nausea, vomiting or hematemesis. No history suggestive of odynophagia or dysphagia. There is no history of reflux. No history of wt loss. Deny any personal or family history of liver or pancreatic disease. There is no history of anemia. No prior history of EGD. He had colonoscopy 2017 that showed sigmoid colits. Path were suggestive of IBD. No further work up done. No family history of colon cancer or any other GI malignancy. No history of any abdominal surgery except lap dangelo. Denies alcohol abuse or cigarette smoking.   He has lactose intolerance  He had GB removed for stones    Subjective      Past Medical History:   Past Medical History:   Diagnosis Date   • Hyperlipidemia    • Hypertension        Past Surgical History:   Past Surgical History:   Procedure Laterality Date   • BACK SURGERY     • CHOLECYSTECTOMY     • COLONOSCOPY      2017-Dr Rosales   • HERNIA REPAIR         Family History:   Family History   Problem Relation Age of Onset   • Heart attack Maternal Aunt    • Heart disease Maternal Grandmother    • Heart disease Paternal Grandfather        Social History:   Social History      Socioeconomic History   • Marital status:      Spouse name: Not on file   • Number of children: Not on file   • Years of education: Not on file   • Highest education level: Not on file   Tobacco Use   • Smoking status: Former Smoker     Years: 20.00     Types: Cigarettes     Last attempt to quit:      Years since quittin.5   • Smokeless tobacco: Never Used   Substance and Sexual Activity   • Alcohol use: No   • Sexual activity: Defer         Current Outpatient Medications:   •  Blood Glucose Monitoring Suppl (ONE TOUCH ULTRA 2) w/Device kit, USE DAILY, Disp: 1 each, Rfl: 0  •  cetirizine (zyrTEC) 10 MG tablet, Take 10 mg by mouth Daily., Disp: , Rfl:   •  glucose blood test strip, Use as instructed; E.11.9, Disp: 100 each, Rfl: 5  •  JANUVIA 50 MG tablet, TAKE ONE TABLET BY MOUTH DAILY, Disp: 30 tablet, Rfl: 4  •  ONE TOUCH CLUB LANCETS misc, 1 each Daily. E11.9, Disp: 100 each, Rfl: 5  •  pravastatin (PRAVACHOL) 40 MG tablet, Take 1 tablet by mouth Every Night., Disp: 30 tablet, Rfl: 5  •  bisacodyl (DULCOLAX) 5 MG EC tablet, Take 4 tablets by mouth 1 (One) Time for 1 dose. Please see prep instructions from office., Disp: 4 tablet, Rfl: 0  •  hydrocortisone (ANUSOL-HC) 2.5 % rectal cream, Insert  into the rectum 2 (Two) Times a Day., Disp: 28.35 g, Rfl: 2  •  polyethylene glycol (MiraLax) 17 GM/SCOOP powder, Take 238 g by mouth 1 (One) Time for 1 dose. Please see prep instructions from office., Disp: 238 g, Rfl: 0    No Known Allergies    Review of Systems:   Review of Systems   Constitutional: Negative for appetite change, fatigue, fever and unexpected weight loss.   Respiratory: Negative for cough, shortness of breath and wheezing.    Cardiovascular: Negative for chest pain, palpitations and leg swelling.   Gastrointestinal: Positive for abdominal distention, anal bleeding, blood in stool and diarrhea. Negative for abdominal pain, constipation, nausea, rectal pain, vomiting, GERD and  "indigestion.   Genitourinary: Negative for dysuria, frequency and hematuria.   Musculoskeletal: Negative for back pain and joint swelling.   Skin: Negative for color change, rash and skin lesions.   Neurological: Negative for dizziness, syncope, speech difficulty, weakness, headache and memory problem.   Hematological: Negative for adenopathy. Does not bruise/bleed easily.   Psychiatric/Behavioral: Negative for agitation, behavioral problems, suicidal ideas and depressed mood.       The following portions of the patient's history were reviewed and updated as appropriate: allergies, current medications, past family history, past medical history, past social history, past surgical history and problem list.    Objective     Physical Exam:  Vital Signs:   Vitals:    07/22/20 0928   BP: 154/98   Pulse: 68   Temp: 97.7 °F (36.5 °C)   TempSrc: Temporal   Weight: 78.9 kg (174 lb)   Height: 167.6 cm (66\")       Physical Exam   Constitutional: He is oriented to person, place, and time. He appears well-developed and well-nourished.   HENT:   Head: Normocephalic and atraumatic.   Right Ear: External ear normal.   Left Ear: External ear normal.   Mouth/Throat: Oropharynx is clear and moist.   Eyes: Pupils are equal, round, and reactive to light. Conjunctivae and EOM are normal.   Neck: Normal range of motion. No tracheal deviation present. No thyromegaly present.   Cardiovascular: Normal rate and regular rhythm.   No murmur heard.  Pulmonary/Chest: Effort normal and breath sounds normal. No respiratory distress.   Abdominal: Soft. Bowel sounds are normal. He exhibits no mass. There is tenderness (Tender LLQ). No hernia.   Musculoskeletal: Normal range of motion. He exhibits no edema.   Neurological: He is alert and oriented to person, place, and time. No cranial nerve deficit or sensory deficit.   Skin: Skin is warm and dry.   Psychiatric: He has a normal mood and affect. His behavior is normal. Judgment and thought content " normal.   Nursing note and vitals reviewed.      Results Review:   I have reviewed the patient's new clinical and imaging results and agree with the interpretation.     No visits with results within 90 Day(s) from this visit.   Latest known visit with results is:   Office Visit on 01/17/2020   Component Date Value Ref Range Status   • Hemoglobin A1C 01/17/2020 7.8  % Final      No radiology results for the last 90 days.    Assessment / Plan      Assessment & Plan:  1. Ulcerative proctocolitis (CMS/HCC)  Clinical history prior colonoscopy findings and pathology findings were more suggestive of inflammatory bowel disease most likely ulcerative colitis.   He has been having small bloody diarrhea at least 6-7 times per day.  No fever chills.  Does have a left lower quadrant abdominal discomfort.   He had a colonoscopy done in 2017 which revealed as per report sigmoid colitis.  Pathology is in more in favor of ulcerative colitis.   I am not entirely sure why this patient was not followed by GI or any further work-up and management was performed.   We will schedule him for an urgent colonoscopy  We will get a CBC CMP  We will get a stool for C. difficile and stool calprotectin    The indications, technique, alternatives and potential risk and complications were discussed with the patient including but not limited to bleeding, bowel perforations, missing lesions and anesthetic complications. The patient understands and wishes to proceed with the procedure and has given their verbal consent. Written patient education information was given to the patient.   The patient will call if they have further questions before procedure.       - Case Request; Standing  - Implement Anesthesia Orders Day of Procedure; Standing  - Obtain Informed Consent; Standing  - Verify Bowel Prep Was Successful; Standing  - Oxygen Therapy- Nasal Cannula; 2 LPM; Titrate for SPO2: equal to or greater than, 90%; Standing  - POC Glucose Once; Standing  -  sodium chloride 0.9 % infusion  - Case Request  - CBC Auto Differential; Future  - Comprehensive Metabolic Panel; Future  - Calprotectin, Fecal - Stool, Per Rectum; Future  - Clostridium Difficile Toxin, PCR - Stool, Per Rectum; Future    2. Bloody diarrhea  Likely ulcerative colitis.   Will start treatment when once colonoscopy is performed      3. Lactose intolerance  No history of lactose intolerance.  Advised to avoid lactose        Follow Up:   No follow-ups on file.    Karina Castillo MD  [facility]  7/22/2020  10:39    Please note that portions of this note may have been completed with a voice recognition program. Efforts were made to edit the dictations, but occasionally words are mistranscribed.

## 2020-07-24 PROBLEM — K51.30 ULCERATIVE PROCTOCOLITIS (HCC): Status: ACTIVE | Noted: 2020-07-24

## 2020-07-24 PROBLEM — R19.7 BLOODY DIARRHEA: Status: ACTIVE | Noted: 2020-07-24

## 2020-07-27 ENCOUNTER — LAB (OUTPATIENT)
Dept: LAB | Facility: HOSPITAL | Age: 50
End: 2020-07-27

## 2020-07-27 DIAGNOSIS — K51.30 ULCERATIVE PROCTOCOLITIS (HCC): ICD-10-CM

## 2020-07-27 DIAGNOSIS — R19.7 BLOODY DIARRHEA: ICD-10-CM

## 2020-07-27 DIAGNOSIS — Z01.812 PRE-PROCEDURE LAB EXAM: ICD-10-CM

## 2020-07-27 LAB
BASOPHILS # BLD AUTO: 0.1 10*3/MM3 (ref 0–0.2)
BASOPHILS NFR BLD AUTO: 1 % (ref 0–1.5)
DEPRECATED RDW RBC AUTO: 41.3 FL (ref 37–54)
EOSINOPHIL # BLD AUTO: 0.44 10*3/MM3 (ref 0–0.4)
EOSINOPHIL NFR BLD AUTO: 4.6 % (ref 0.3–6.2)
ERYTHROCYTE [DISTWIDTH] IN BLOOD BY AUTOMATED COUNT: 13.3 % (ref 12.3–15.4)
HCT VFR BLD AUTO: 44.2 % (ref 37.5–51)
HGB BLD-MCNC: 15.4 G/DL (ref 13–17.7)
IMM GRANULOCYTES # BLD AUTO: 0.04 10*3/MM3 (ref 0–0.05)
IMM GRANULOCYTES NFR BLD AUTO: 0.4 % (ref 0–0.5)
LYMPHOCYTES # BLD AUTO: 2.61 10*3/MM3 (ref 0.7–3.1)
LYMPHOCYTES NFR BLD AUTO: 27.4 % (ref 19.6–45.3)
MCH RBC QN AUTO: 29.5 PG (ref 26.6–33)
MCHC RBC AUTO-ENTMCNC: 34.8 G/DL (ref 31.5–35.7)
MCV RBC AUTO: 84.7 FL (ref 79–97)
MONOCYTES # BLD AUTO: 0.76 10*3/MM3 (ref 0.1–0.9)
MONOCYTES NFR BLD AUTO: 8 % (ref 5–12)
NEUTROPHILS NFR BLD AUTO: 5.58 10*3/MM3 (ref 1.7–7)
NEUTROPHILS NFR BLD AUTO: 58.6 % (ref 42.7–76)
NRBC BLD AUTO-RTO: 0.1 /100 WBC (ref 0–0.2)
PLATELET # BLD AUTO: 338 10*3/MM3 (ref 140–450)
PMV BLD AUTO: 10.4 FL (ref 6–12)
RBC # BLD AUTO: 5.22 10*6/MM3 (ref 4.14–5.8)
WBC # BLD AUTO: 9.53 10*3/MM3 (ref 3.4–10.8)

## 2020-07-27 PROCEDURE — 80053 COMPREHEN METABOLIC PANEL: CPT

## 2020-07-27 PROCEDURE — C9803 HOPD COVID-19 SPEC COLLECT: HCPCS

## 2020-07-27 PROCEDURE — U0004 COV-19 TEST NON-CDC HGH THRU: HCPCS

## 2020-07-27 PROCEDURE — U0002 COVID-19 LAB TEST NON-CDC: HCPCS

## 2020-07-27 PROCEDURE — 36415 COLL VENOUS BLD VENIPUNCTURE: CPT

## 2020-07-27 PROCEDURE — 85025 COMPLETE CBC W/AUTO DIFF WBC: CPT

## 2020-07-28 LAB
ALBUMIN SERPL-MCNC: 4.5 G/DL (ref 3.5–5.2)
ALBUMIN/GLOB SERPL: 1.5 G/DL
ALP SERPL-CCNC: 76 U/L (ref 39–117)
ALT SERPL W P-5'-P-CCNC: 26 U/L (ref 1–41)
ANION GAP SERPL CALCULATED.3IONS-SCNC: 10.7 MMOL/L (ref 5–15)
AST SERPL-CCNC: 20 U/L (ref 1–40)
BILIRUB SERPL-MCNC: 0.4 MG/DL (ref 0–1.2)
BUN SERPL-MCNC: 10 MG/DL (ref 6–20)
BUN/CREAT SERPL: 11.1 (ref 7–25)
CALCIUM SPEC-SCNC: 9.4 MG/DL (ref 8.6–10.5)
CHLORIDE SERPL-SCNC: 102 MMOL/L (ref 98–107)
CO2 SERPL-SCNC: 27.3 MMOL/L (ref 22–29)
CREAT SERPL-MCNC: 0.9 MG/DL (ref 0.76–1.27)
GFR SERPL CREATININE-BSD FRML MDRD: 90 ML/MIN/1.73
GLOBULIN UR ELPH-MCNC: 3.1 GM/DL
GLUCOSE SERPL-MCNC: 96 MG/DL (ref 65–99)
POTASSIUM SERPL-SCNC: 3.9 MMOL/L (ref 3.5–5.2)
PROT SERPL-MCNC: 7.6 G/DL (ref 6–8.5)
REF LAB TEST METHOD: NORMAL
SARS-COV-2 RNA RESP QL NAA+PROBE: NOT DETECTED
SODIUM SERPL-SCNC: 140 MMOL/L (ref 136–145)

## 2020-07-30 ENCOUNTER — HOSPITAL ENCOUNTER (OUTPATIENT)
Facility: HOSPITAL | Age: 50
Setting detail: HOSPITAL OUTPATIENT SURGERY
Discharge: HOME | End: 2020-07-30
Attending: INTERNAL MEDICINE | Admitting: INTERNAL MEDICINE

## 2020-07-30 ENCOUNTER — ANESTHESIA (OUTPATIENT)
Dept: GASTROENTEROLOGY | Facility: HOSPITAL | Age: 50
End: 2020-07-30

## 2020-07-30 ENCOUNTER — ANESTHESIA EVENT (OUTPATIENT)
Dept: GASTROENTEROLOGY | Facility: HOSPITAL | Age: 50
End: 2020-07-30

## 2020-07-30 VITALS
SYSTOLIC BLOOD PRESSURE: 151 MMHG | WEIGHT: 170 LBS | OXYGEN SATURATION: 100 % | DIASTOLIC BLOOD PRESSURE: 89 MMHG | HEIGHT: 66 IN | TEMPERATURE: 98 F | BODY MASS INDEX: 27.32 KG/M2 | HEART RATE: 83 BPM | RESPIRATION RATE: 18 BRPM

## 2020-07-30 DIAGNOSIS — R19.7 BLOODY DIARRHEA: ICD-10-CM

## 2020-07-30 DIAGNOSIS — K51.30 ULCERATIVE PROCTOCOLITIS (HCC): ICD-10-CM

## 2020-07-30 LAB — GLUCOSE BLDC GLUCOMTR-MCNC: 113 MG/DL (ref 70–130)

## 2020-07-30 PROCEDURE — 82962 GLUCOSE BLOOD TEST: CPT

## 2020-07-30 PROCEDURE — 25010000002 PROPOFOL 200 MG/20ML EMULSION: Performed by: NURSE ANESTHETIST, CERTIFIED REGISTERED

## 2020-07-30 PROCEDURE — 45380 COLONOSCOPY AND BIOPSY: CPT | Performed by: INTERNAL MEDICINE

## 2020-07-30 PROCEDURE — 25010000002 PROPOFOL 10 MG/ML EMULSION: Performed by: NURSE ANESTHETIST, CERTIFIED REGISTERED

## 2020-07-30 RX ORDER — MESALAMINE 800 MG/1
TABLET, DELAYED RELEASE ORAL
Qty: 180 TABLET | Refills: 1 | Status: SHIPPED | OUTPATIENT
Start: 2020-07-30 | End: 2020-08-24

## 2020-07-30 RX ORDER — SODIUM CHLORIDE 9 MG/ML
70 INJECTION, SOLUTION INTRAVENOUS CONTINUOUS PRN
Status: DISCONTINUED | OUTPATIENT
Start: 2020-07-30 | End: 2020-07-30 | Stop reason: HOSPADM

## 2020-07-30 RX ORDER — LIDOCAINE HYDROCHLORIDE 20 MG/ML
INJECTION, SOLUTION INTRAVENOUS AS NEEDED
Status: DISCONTINUED | OUTPATIENT
Start: 2020-07-30 | End: 2020-07-30 | Stop reason: SURG

## 2020-07-30 RX ORDER — PROPOFOL 10 MG/ML
INJECTION, EMULSION INTRAVENOUS AS NEEDED
Status: DISCONTINUED | OUTPATIENT
Start: 2020-07-30 | End: 2020-07-30 | Stop reason: SURG

## 2020-07-30 RX ORDER — KETAMINE HCL IN NACL, ISO-OSM 100MG/10ML
SYRINGE (ML) INJECTION AS NEEDED
Status: DISCONTINUED | OUTPATIENT
Start: 2020-07-30 | End: 2020-07-30 | Stop reason: SURG

## 2020-07-30 RX ORDER — MESALAMINE 4 G/60ML
4 ENEMA RECTAL SEE ADMIN INSTRUCTIONS
Qty: 28 ENEMA | Refills: 1 | Status: SHIPPED | OUTPATIENT
Start: 2020-07-30 | End: 2020-08-24

## 2020-07-30 RX ORDER — SODIUM CHLORIDE 0.9 % (FLUSH) 0.9 %
10 SYRINGE (ML) INJECTION AS NEEDED
Status: DISCONTINUED | OUTPATIENT
Start: 2020-07-30 | End: 2020-07-30 | Stop reason: HOSPADM

## 2020-07-30 RX ORDER — PREDNISONE 10 MG/1
TABLET ORAL
Qty: 91 TABLET | Refills: 0 | Status: SHIPPED | OUTPATIENT
Start: 2020-07-30 | End: 2021-04-20

## 2020-07-30 RX ADMIN — Medication 50 MG: at 16:24

## 2020-07-30 RX ADMIN — LIDOCAINE HYDROCHLORIDE 40 MG: 20 INJECTION, SOLUTION INTRAVENOUS at 16:27

## 2020-07-30 RX ADMIN — SODIUM CHLORIDE 70 ML/HR: 9 INJECTION, SOLUTION INTRAVENOUS at 13:01

## 2020-07-30 RX ADMIN — LIDOCAINE HYDROCHLORIDE 100 MG: 20 INJECTION, SOLUTION INTRAVENOUS at 16:23

## 2020-07-30 RX ADMIN — PROPOFOL 100 MG: 10 INJECTION, EMULSION INTRAVENOUS at 16:23

## 2020-07-30 RX ADMIN — PROPOFOL 100 MCG/KG/MIN: 10 INJECTION, EMULSION INTRAVENOUS at 16:24

## 2020-07-30 NOTE — ANESTHESIA POSTPROCEDURE EVALUATION
Patient: Ulices Negron    Procedure Summary     Date:  07/30/20 Room / Location:  [facility] ENDOSCOPY 2 / [facility] ENDOSCOPY    Anesthesia Start:  1622 Anesthesia Stop:  1644    Procedure:  COLONOSCOPY (N/A Anus) Diagnosis:       Ulcerative proctocolitis (CMS/HCC)      Bloody diarrhea      (Ulcerative proctocolitis (CMS/HCC) [K51.30])      (Bloody diarrhea [R19.7])    Surgeon:  Karina Castillo MD Provider:  Glenn Eastman CRNA    Anesthesia Type:  MAC ASA Status:  2          Anesthesia Type: MAC    Vitals  Vitals Value Taken Time   /86 7/30/2020  4:48 PM   Temp 98 °F (36.7 °C) 7/30/2020  4:48 PM   Pulse 88 7/30/2020  4:48 PM   Resp 18 7/30/2020  4:48 PM   SpO2 97 % 7/30/2020  4:48 PM           Post Anesthesia Care and Evaluation    Patient location during evaluation: bedside  Patient participation: complete - patient participated  Level of consciousness: awake and alert  Pain score: 0  Pain management: satisfactory to patient  Airway patency: patent  Anesthetic complications: No anesthetic complications  PONV Status: none  Cardiovascular status: acceptable and hemodynamically stable  Respiratory status: acceptable  Hydration status: acceptable

## 2020-07-30 NOTE — ANESTHESIA PREPROCEDURE EVALUATION
Anesthesia Evaluation     Patient summary reviewed and Nursing notes reviewed   NPO Solid Status: > 8 hours  NPO Liquid Status: > 8 hours           Airway   Mallampati: I  TM distance: >3 FB  Neck ROM: full  No difficulty expected  Dental - normal exam     Pulmonary - normal exam   (-) sleep apnea  Cardiovascular - normal exam  Exercise tolerance: good (4-7 METS)    (+) hypertension, hyperlipidemia,       Neuro/Psych  (+) headaches, numbness,     GI/Hepatic/Renal/Endo    (+)  GERD, GI bleeding , diabetes mellitus type 2,     Musculoskeletal     Abdominal  - normal exam    Bowel sounds: normal.   Substance History - negative use     OB/GYN negative ob/gyn ROS         Other   arthritis,                    Anesthesia Plan    ASA 2     MAC     intravenous induction     Anesthetic plan, all risks, benefits, and alternatives have been provided, discussed and informed consent has been obtained with: patient.    Plan discussed with attending.

## 2020-08-04 LAB
LAB AP CASE REPORT: NORMAL
PATH REPORT.FINAL DX SPEC: NORMAL

## 2020-08-24 ENCOUNTER — OFFICE VISIT (OUTPATIENT)
Dept: GASTROENTEROLOGY | Facility: CLINIC | Age: 50
End: 2020-08-24

## 2020-08-24 VITALS
BODY MASS INDEX: 27.8 KG/M2 | HEART RATE: 64 BPM | HEIGHT: 66 IN | SYSTOLIC BLOOD PRESSURE: 122 MMHG | DIASTOLIC BLOOD PRESSURE: 82 MMHG | RESPIRATION RATE: 18 BRPM | TEMPERATURE: 98.6 F | WEIGHT: 173 LBS | OXYGEN SATURATION: 98 %

## 2020-08-24 DIAGNOSIS — K51.30 ULCERATIVE PROCTOCOLITIS (HCC): Primary | ICD-10-CM

## 2020-08-24 DIAGNOSIS — K51.30 ULCERATIVE RECTOSIGMOIDITIS WITHOUT COMPLICATION (HCC): ICD-10-CM

## 2020-08-24 PROCEDURE — 99214 OFFICE O/P EST MOD 30 MIN: CPT | Performed by: INTERNAL MEDICINE

## 2020-08-24 RX ORDER — MESALAMINE 800 MG/1
TABLET, DELAYED RELEASE ORAL
Qty: 180 TABLET | Refills: 2 | Status: SHIPPED | OUTPATIENT
Start: 2020-08-24 | End: 2021-10-05

## 2020-08-24 NOTE — PATIENT INSTRUCTIONS
Ulcerative Colitis, Adult    Ulcerative colitis is long-lasting (chronic) inflammation of the large intestine (colon) and rectum. Sores (ulcers) may also form in these areas.  Ulcerative colitis, along with a closely related condition called Crohn's disease, is often referred to as inflammatory bowel disease (IBD).  What are the causes?  This condition may be caused by increased activity of the immune system in the intestines. The immune system is the system that protects the body against harmful bacteria, viruses, fungi, and other things that can make you sick. The cause of the increased activity of the immune system is not known.  What increases the risk?  The following factors may make you more likely to develop this condition:  · Being 15-40 years old. The risk is also increased for people who are 50-70 years old.  · Having a family history of ulcerative colitis.  · Being of Baptism descent.  What are the signs or symptoms?  Symptoms vary depending on how severe the condition is. Common symptoms include:  · Rectal bleeding.  · Diarrhea, often with blood or pus in the stool.  Other symptoms can include:  · Pain or cramping in the abdomen.  · Fever.  · Fatigue.  · Weight loss.  · Night sweats.  · Rectal pain.  · A strong and sudden need to have a bowel movement (bowel urgency).  · Nausea.  · Loss of appetite.  · Anemia.  · Yellowing of the skin (jaundice) from liver dysfunction.  · Joint pain or soreness.  · Eye irritation.  · Skin rashes.  Symptoms can range from mild to severe. They may come and go.  How is this diagnosed?  This condition may be diagnosed based on:  · Your symptoms and medical history.  · A physical exam.  · Tests, including:  ? Blood tests and stool tests.  ? X-ray.  ? A CT scan.  ? An MRI.  ? Colonoscopy. For this test, a flexible tube is inserted into your anus, and your colon is examined.  ? Biopsy. In this test, a tissue sample is taken from your colon and examined under a microscope.  How  is this treated?  Treatment for this condition may include medicines to:  · Decrease swelling and inflammation.  · Control your immune system.  · Treat infections.  · Relieve pain.  · Control diarrhea.  Severe flare-ups may need to be treated at a hospital. Treatment in a hospital may involve:  · Resting the bowel. This involves not eating or drinking for a period of time.  · Getting medicines through an IV.  · Getting fluids and nutrition through:  ? An IV.  ? A tube that is passed through the nose and into the stomach (nasogastric tube, or NG tube).  · Surgery to remove the affected part of the colon. This may be done if other treatments are not helping.  This condition increases the risk of colon cancer. Adults with this condition will need to be watched for colon cancer throughout life.  Follow these instructions at home:  Medicines and vitamins  · Take over-the-counter and prescription medicines only as told by your health care provider. Do not take aspirin.  · If you were prescribed an antibiotic medicine, take it as told by your health care provider. Do not stop taking the antibiotic even if you start to feel better.  · Ask your health care provider if you should take any vitamins or supplements. You may need to take:  ? Calcium and vitamin D for bone health.  ? Iron to help treat anemia.  Lifestyle  · Exercise regularly.  · Work with your health care provider to manage your condition and educate yourself about your condition.  · Do not use any products that contain nicotine or tobacco, such as cigarettes, e-cigarettes, and chewing tobacco. If you need help quitting, ask your health care provider.  · If you drink alcohol:  ? Limit how much you use to:  § 0-1 drink a day for women.  § 0-2 drinks a day for men.  ? Be aware of how much alcohol is in your drink. In the [place] one drink equals one 12 oz bottle of beer (355 mL), one 5 oz glass of wine (148 mL), or one 1½ oz glass of hard liquor (44 mL).  Eating and  drinking  · Drink enough fluid to keep your urine pale yellow.  · Ask your health care provider about the best diet for you. Follow the diet as told by your health care provider. This may include:  ? Avoiding carbonated drinks.  ? Avoiding popcorn, vegetable skins, nuts, and other high-fiber foods.  ? Avoiding high-fat foods.  ? Eating smaller meals more often.  ? Limiting your intake of sugary drinks.  ? Limiting your caffeine intake.  · Follow food safety recommendations as told by your health care provider. This may include making sure you:  ? Avoid eating raw or undercooked meat, fish, or eggs.  ? Do not eat or drink spoiled or  foods and drinks.  · Keep a food diary. This may help you identify and avoid any foods that trigger your symptoms.  General instructions  · Wash your hands often with soap and water. If soap and water are not available, use hand .  · Stay up to date on your vaccinations, including a yearly (annual) flu shot. Ask your health care provider which vaccines you should get.  · Follow recommendations from your health care provider for having cancer screening tests. Ulcerative colitis may place you at increased risk for colon cancer.  · Keep all follow-up visits as told by your health care provider. This is important.  Contact a health care provider if:  · Your symptoms do not improve or they get worse with treatment.  · You continue to lose weight.  · You have constant cramps or loose stools.  · You develop a new skin rash, skin sores, or eye problems.  · You have a fever or chills.  Get help right away if:  · You have bloody diarrhea.  · You have severe bleeding from the rectum.  · You feel that your heart is racing (tachycardia).  · You have severe pain in your abdomen.  · Your abdomen swells (abdominal distension).  · Your abdomen is tender to the touch.  · You vomit.  Summary  · Ulcerative colitis is long-lasting (chronic) inflammation of the large intestine (colon) and  rectum. Sores (ulcers) may also form in these areas.  · Follow instructions from your health care provider about medicines, lifestyle changes, and eating and drinking.  · Contact your health care provider if symptoms do not improve or they get worse with treatment.  · Get help right away if you have severe abdominal pain, abdominal swelling, or severe bleeding from the rectum.  · Keep all follow-up visits as told by your health care provider. This is important.  This information is not intended to replace advice given to you by your health care provider. Make sure you discuss any questions you have with your health care provider.  Document Released: 09/27/2006 Document Revised: 10/07/2019 Document Reviewed: 10/09/2019  Elsevier Patient Education © 2020 [employer]

## 2020-08-24 NOTE — PROGRESS NOTES
Follow Up Note     Date: 2020   Patient Name: Ulices Negron  MRN: [id number]  : 1970     Referring Physician: Carmen Benitez DO    Chief Complaint:    Chief Complaint   Patient presents with   • Follow-up   • Ulcerative Colitis       Interval History:   2020  Ulices Negron is a 50 y.o. male who is here today for follow up for diarrhea and rectal bleeding.  He is here to discuss the pathology report and further plan.  He states that his diarrhea has significantly improved and he is having around 3-4 bowel movements now soft formed sometimes without any bleeding.  No abdominal pain no fever chills.  He is currently on a steroid tapering dose    2020  Ulices Negron is a 50 y.o. male who is here today to establish care with Gastroenterology for evaluation of rectal bleeding.   The patient has history of bright red blood per rectum on and off for the last 2 months.  This is described as mild to moderate, in wipes and in the stool almost daily now.  He will have atleast 6-7 times loose stool daily.  No associated abdominal pain. The patient denies anorectal pain. Deny any use of NSAID or blood thinners except occasional aleeve. He has associated bloating and tenesmus     Pt denies nausea, vomiting or hematemesis. No history suggestive of odynophagia or dysphagia. There is no history of reflux. No history of wt loss. Deny any personal or family history of liver or pancreatic disease. There is no history of anemia. No prior history of EGD. He had colonoscopy  that showed sigmoid colits. Path were suggestive of IBD. No further work up done. No family history of colon cancer or any other GI malignancy. No history of any abdominal surgery except lap dangelo. Denies alcohol abuse or cigarette smoking.   He has lactose intolerance  He had GB removed for stones  Subjective      Past Medical History:   Past Medical History:   Diagnosis Date   • Diabetes mellitus (CMS/HCC)     • Elevated cholesterol    • Hyperlipidemia    • Hypertension      Past Surgical History:   Past Surgical History:   Procedure Laterality Date   • BACK SURGERY     • CHOLECYSTECTOMY     • COLONOSCOPY      2017-Dr Rosales   • COLONOSCOPY N/A 2020    Procedure: COLONOSCOPY;  Surgeon: Karina Castillo MD;  Location: [facility];  Service: Gastroenterology;  Laterality: N/A;   • HERNIA REPAIR         Family History:   Family History   Problem Relation Age of Onset   • Heart attack Maternal Aunt    • Heart disease Maternal Grandmother    • Heart disease Paternal Grandfather        Social History:   Social History     Socioeconomic History   • Marital status:      Spouse name: Not on file   • Number of children: Not on file   • Years of education: Not on file   • Highest education level: Not on file   Tobacco Use   • Smoking status: Former Smoker     Years: 20.00     Types: Cigarettes     Last attempt to quit:      Years since quittin.6   • Smokeless tobacco: Never Used   Substance and Sexual Activity   • Alcohol use: No   • Drug use: Never   • Sexual activity: Defer       Medications:     Current Outpatient Medications:   •  Blood Glucose Monitoring Suppl (ONE TOUCH ULTRA 2) w/Device kit, USE DAILY, Disp: 1 each, Rfl: 0  •  cetirizine (zyrTEC) 10 MG tablet, Take 10 mg by mouth Daily., Disp: , Rfl:   •  glucose blood test strip, Use as instructed; E.11.9, Disp: 100 each, Rfl: 5  •  JANUVIA 50 MG tablet, TAKE ONE TABLET BY MOUTH DAILY (Patient taking differently: Take 50 mg by mouth Daily.), Disp: 30 tablet, Rfl: 4  •  mesalamine (ASACOL) 800 MG EC tablet, Take 2 tablets by mouth 3 times daily, Disp: 180 tablet, Rfl: 1  •  mesalamine (ROWASA) 4 g enema, Insert 1 enema into the rectum once nightly, try and retain overnight, Disp: 28 enema, Rfl: 1  •  ONE TOUCH CLUB LANCETS misc, 1 each Daily. E11.9, Disp: 100 each, Rfl: 5  •  pravastatin (PRAVACHOL) 40 MG tablet, Take 1 tablet by mouth Every  "Night., Disp: 30 tablet, Rfl: 5  •  predniSONE (DELTASONE) 10 MG tablet, 40 mg po daily for 2 weeks followed by 30 mg for 1 week followed by 20 mg daily for 1 week  Dispense: 91 tablet, Disp: 91 tablet, Rfl: 0    Allergies:   No Known Allergies    Review of Systems:   Review of Systems   Constitutional: Negative for appetite change, fatigue and unexpected weight loss.   HENT: Negative for trouble swallowing.    Gastrointestinal: Positive for diarrhea. Negative for abdominal distention, abdominal pain, anal bleeding, blood in stool, nausea, rectal pain, vomiting, GERD and indigestion.       The following portions of the patient's history were reviewed and updated as appropriate: allergies, current medications, past family history, past medical history, past social history, past surgical history and problem list.    Objective     Physical Exam:  Vital Signs:   Vitals:    08/24/20 1420   BP: 122/82   Pulse: 64   Resp: 18   Temp: 98.6 °F (37 °C)   TempSrc: Temporal   SpO2: 98%   Weight: 78.5 kg (173 lb)   Height: 167.6 cm (66\")       Physical Exam   Constitutional: He is oriented to person, place, and time. Vital signs are normal. He appears well-developed and well-nourished.   HENT:   Head: Normocephalic and atraumatic.   Right Ear: External ear normal.   Left Ear: External ear normal.   Nose: Nose normal.   Mouth/Throat: Oropharynx is clear and moist.   Eyes: Conjunctivae are normal.   Neck: Normal range of motion. Neck supple.   Cardiovascular: Normal rate, regular rhythm and normal heart sounds.   Pulmonary/Chest: Effort normal and breath sounds normal.   Abdominal: Soft. Bowel sounds are normal. He exhibits no distension, no ascites and no mass. There is no tenderness.   Musculoskeletal: Normal range of motion.   Neurological: He is alert and oriented to person, place, and time.   Skin: Skin is warm and dry.   Psychiatric: He has a normal mood and affect. His behavior is normal. Judgment normal.   Nursing note and " vitals reviewed.      Results Review:   I reviewed the patient's new clinical results.    Admission on 07/30/2020, Discharged on 07/30/2020   Component Date Value Ref Range Status   • Glucose 07/30/2020 113  70 - 130 mg/dL Final    Serial Number: [id number]:  [id number]   • Case Report 07/30/2020    Final                    Value:Surgical Pathology Report                         Case: [id number]                                  Authorizing Provider:  Karina Castillo MD  Collected:           07/30/2020 04:29 PM          Ordering Location:     [facility]    Received:            07/31/2020 09:24 AM                                 [facility]                                                                    Pathologist:           Dayan Savage DO                                                        Specimens:   1) - Small Intestine, Ileum, terminal bx                                                            2) - Large Intestine, Right / Ascending Colon, and cecum bx                                         3) - Large Intestine, Transverse Colon                                                              4) - Large Intestine, Left / Descending Colon, bx                                                   5) - Large Intestine, Sigmoid Colon                                                                                           6) - Large Intestine, Rectum                                                              • Final Diagnosis 07/30/2020    Final                    Value:This result contains rich text formatting which cannot be displayed here.   Lab on 07/27/2020   Component Date Value Ref Range Status   • WBC 07/27/2020 9.53  3.40 - 10.80 10*3/mm3 Final   • RBC 07/27/2020 5.22  4.14 - 5.80 10*6/mm3 Final   • Hemoglobin 07/27/2020 15.4  13.0 - 17.7 g/dL Final   • Hematocrit 07/27/2020 44.2  37.5 - 51.0 % Final   • MCV 07/27/2020 84.7  79.0 - 97.0 fL Final   • MCH 07/27/2020 29.5  26.6  - 33.0 pg Final   • MCHC 07/27/2020 34.8  31.5 - 35.7 g/dL Final   • RDW 07/27/2020 13.3  12.3 - 15.4 % Final   • RDW-SD 07/27/2020 41.3  37.0 - 54.0 fl Final   • MPV 07/27/2020 10.4  6.0 - 12.0 fL Final   • Platelets 07/27/2020 338  140 - 450 10*3/mm3 Final   • Neutrophil % 07/27/2020 58.6  42.7 - 76.0 % Final   • Lymphocyte % 07/27/2020 27.4  19.6 - 45.3 % Final   • Monocyte % 07/27/2020 8.0  5.0 - 12.0 % Final   • Eosinophil % 07/27/2020 4.6  0.3 - 6.2 % Final   • Basophil % 07/27/2020 1.0  0.0 - 1.5 % Final   • Immature Grans % 07/27/2020 0.4  0.0 - 0.5 % Final   • Neutrophils, Absolute 07/27/2020 5.58  1.70 - 7.00 10*3/mm3 Final   • Lymphocytes, Absolute 07/27/2020 2.61  0.70 - 3.10 10*3/mm3 Final   • Monocytes, Absolute 07/27/2020 0.76  0.10 - 0.90 10*3/mm3 Final   • Eosinophils, Absolute 07/27/2020 0.44* 0.00 - 0.40 10*3/mm3 Final   • Basophils, Absolute 07/27/2020 0.10  0.00 - 0.20 10*3/mm3 Final   • Immature Grans, Absolute 07/27/2020 0.04  0.00 - 0.05 10*3/mm3 Final   • nRBC 07/27/2020 0.1  0.0 - 0.2 /100 WBC Final   • Glucose 07/27/2020 96  65 - 99 mg/dL Final   • BUN 07/27/2020 10  6 - 20 mg/dL Final   • Creatinine 07/27/2020 0.90  0.76 - 1.27 mg/dL Final   • Sodium 07/27/2020 140  136 - 145 mmol/L Final   • Potassium 07/27/2020 3.9  3.5 - 5.2 mmol/L Final   • Chloride 07/27/2020 102  98 - 107 mmol/L Final   • CO2 07/27/2020 27.3  22.0 - 29.0 mmol/L Final   • Calcium 07/27/2020 9.4  8.6 - 10.5 mg/dL Final   • Total Protein 07/27/2020 7.6  6.0 - 8.5 g/dL Final   • Albumin 07/27/2020 4.50  3.50 - 5.20 g/dL Final   • ALT (SGPT) 07/27/2020 26  1 - 41 U/L Final   • AST (SGOT) 07/27/2020 20  1 - 40 U/L Final   • Alkaline Phosphatase 07/27/2020 76  39 - 117 U/L Final   • Total Bilirubin 07/27/2020 0.4  0.0 - 1.2 mg/dL Final   • eGFR Non African Amer 07/27/2020 90  >60 mL/min/1.73 Final   • Globulin 07/27/2020 3.1  gm/dL Final   • A/G Ratio 07/27/2020 1.5  g/dL Final   • BUN/Creatinine Ratio 07/27/2020 11.1  7.0  - 25.0 Final   • Anion Gap 07/27/2020 10.7  5.0 - 15.0 mmol/L Final   • Reference Lab Report 07/27/2020 See scanned report   Final   • COVID19 07/27/2020 Not Detected  Not Detected - Ref. Range Final      No radiology results for the last 90 days.    Assessment / Plan      1. Ulcerative procto sigmoiditis   He had a colonoscopy on 7/30/2020 which revealed a mild to moderate inflammation involving the rectum and sigmoid of 35 cm from anal verge.  Endoscopic findings were consistent with ulcerative proctosigmoiditis.  His biopsies from the rectum and the sigmoid revealed chronic inflammatory findings with cryptitis suggesting IBD.  His prior biopsies in 2017 also showed signs of chronic colitis.  No granulomas identified.. Biopsies from the descending colon transverse colon descending colon and the terminal ileum were normal.  He did not give a stool sample for testing.  His CMP CBC reviewed  He had an issue with the insurance approval for medication mesalamine pills and the enemas and hence he was started on prednisone tapering dose   He is feeling better now with the medication.  Bowel movements have improved to 3 to 4/day now without any bleeding no abdominal pain no fever chills.   We will start him on mesalamine 4.8 g, daily  (2 pills p.o. 3 times daily).  We will complete the steroid tapering dose in the next 3 weeks time.  We will consider maintenance dose after 3 months of high-dose mesalamine  We will get the chronic otitis panel to check for immunity to hepatitis A and hepatitis B. we will also get a vitamin D level  We will get his stool calprotectin level after 3 months      7/22/2020  Clinical history prior colonoscopy findings and pathology findings were more suggestive of inflammatory bowel disease most likely ulcerative colitis.   He has been having small bloody diarrhea at least 6-7 times per day.  No fever chills.  Does have a left lower quadrant abdominal discomfort.   He had a colonoscopy done in  2017 which revealed as per report sigmoid colitis.  Pathology is in more in favor of ulcerative colitis.   I am not entirely sure why this patient was not followed by GI or any further work-up and management was performed.   We will schedule him for an urgent colonoscopy  We will get a CBC CMP  We will get a stool for C. difficile and stool calprotectin        2. Lactose intolerance  No history of lactose intolerance.  Advised to avoid lactose          Follow Up:   No follow-ups on file.    Karina Castillo MD  [facility] [place]  8/24/2020  14:23     Please note that portions of this note may have been completed with a voice recognition program.

## 2020-10-22 DIAGNOSIS — K51.30 ULCERATIVE RECTOSIGMOIDITIS WITHOUT COMPLICATION (HCC): Primary | ICD-10-CM

## 2020-10-22 RX ORDER — MESALAMINE 4 G/60ML
4 ENEMA RECTAL NIGHTLY
Qty: 30 ENEMA | Refills: 1 | Status: SHIPPED | OUTPATIENT
Start: 2020-10-22 | End: 2021-10-12 | Stop reason: SDUPTHER

## 2020-10-23 ENCOUNTER — TELEPHONE (OUTPATIENT)
Dept: GASTROENTEROLOGY | Facility: CLINIC | Age: 50
End: 2020-10-23

## 2020-10-23 NOTE — TELEPHONE ENCOUNTER
----- Message from Karina Castillo MD sent at 10/22/2020  2:16 PM EDT -----  We can add enema daily for 8 weeks along with mesalamine.   If that doesn't wok then we have to go for Humira , remicade ets.   I have ordered TB gold and hepatitis test, he can get it done in anticipation for biologic treatment.   I am ordering rowasa enema for 8 weeks witjh continuing with po mesalamine for now.   Let him check whether that is covered by insurance.           ----- Message -----  From: Salma Meadows MA  Sent: 10/22/2020   9:13 AM EDT  To: Karina Castillo MD    Patient called today. He states that he tried the Mesalamine and it isn't helping and it cost over $200. He wants to see if we can do another round of the 30 day Prednisone or if there is something else instead.

## 2020-11-25 ENCOUNTER — TELEPHONE (OUTPATIENT)
Dept: GASTROENTEROLOGY | Facility: CLINIC | Age: 50
End: 2020-11-25

## 2020-12-03 ENCOUNTER — TELEPHONE (OUTPATIENT)
Dept: GASTROENTEROLOGY | Facility: CLINIC | Age: 50
End: 2020-12-03

## 2020-12-13 RX ORDER — SITAGLIPTIN 50 MG/1
TABLET, FILM COATED ORAL
Qty: 90 TABLET | Refills: 0 | Status: SHIPPED | OUTPATIENT
Start: 2020-12-13 | End: 2021-03-21

## 2020-12-18 ENCOUNTER — TELEPHONE (OUTPATIENT)
Dept: GASTROENTEROLOGY | Facility: CLINIC | Age: 50
End: 2020-12-18

## 2021-04-19 RX ORDER — SITAGLIPTIN 50 MG/1
TABLET, FILM COATED ORAL
Qty: 30 TABLET | Refills: 0 | Status: SHIPPED | OUTPATIENT
Start: 2021-04-19 | End: 2021-04-20

## 2021-04-20 ENCOUNTER — OFFICE VISIT (OUTPATIENT)
Dept: INTERNAL MEDICINE | Facility: CLINIC | Age: 51
End: 2021-04-20

## 2021-04-20 VITALS
OXYGEN SATURATION: 98 % | HEIGHT: 66 IN | DIASTOLIC BLOOD PRESSURE: 86 MMHG | SYSTOLIC BLOOD PRESSURE: 140 MMHG | HEART RATE: 72 BPM | WEIGHT: 175.8 LBS | TEMPERATURE: 97.7 F | BODY MASS INDEX: 28.25 KG/M2

## 2021-04-20 DIAGNOSIS — E11.9 TYPE 2 DIABETES MELLITUS WITHOUT COMPLICATION, WITHOUT LONG-TERM CURRENT USE OF INSULIN (HCC): Primary | ICD-10-CM

## 2021-04-20 DIAGNOSIS — E78.5 HYPERLIPIDEMIA, UNSPECIFIED HYPERLIPIDEMIA TYPE: ICD-10-CM

## 2021-04-20 DIAGNOSIS — I10 ESSENTIAL HYPERTENSION: ICD-10-CM

## 2021-04-20 LAB — HBA1C MFR BLD: 7.2 %

## 2021-04-20 PROCEDURE — 83036 HEMOGLOBIN GLYCOSYLATED A1C: CPT | Performed by: FAMILY MEDICINE

## 2021-04-20 PROCEDURE — 99214 OFFICE O/P EST MOD 30 MIN: CPT | Performed by: FAMILY MEDICINE

## 2021-04-20 RX ORDER — LOSARTAN POTASSIUM 50 MG/1
50 TABLET ORAL DAILY
Qty: 90 TABLET | Refills: 3 | Status: SHIPPED | OUTPATIENT
Start: 2021-04-20 | End: 2022-04-20

## 2021-04-20 RX ORDER — PRAVASTATIN SODIUM 40 MG
40 TABLET ORAL NIGHTLY
Qty: 90 TABLET | Refills: 3 | Status: SHIPPED | OUTPATIENT
Start: 2021-04-20 | End: 2021-04-21 | Stop reason: ALTCHOICE

## 2021-04-20 NOTE — TELEPHONE ENCOUNTER
Caller: Ulices Negron    Relationship: Self    Best call back number: [phone]    Medication needed: JANUVIA  When do you need the refill by: ASAP    What additional details did the patient provide when requesting the medication:  PATIENT STATES JANUVIA WAS PRESCRIBED FOR HIM BUT IT WAS FOR A 3 MONTH SUPPLY, AND HE NEEDS A 1 MONTH SUPPLY SENT IN BECAUSE HIS INSURANCE WILL NOT COVER A 3 MONTH SUPPLY    Does the patient have less than a 3 day supply:  [x] Yes  [] No    What [place] the [place] [address] [address] [facility] - [phone] [facility] [phone] FX

## 2021-04-20 NOTE — PROGRESS NOTES
Ulices Negron is a 51 y.o. male.    Chief Complaint   Patient presents with   • Diabetes       HPI   Patient has had diabetes for the past few years. He has been compliant with the medications and denies any side effects from it. He has been monitoring fingersticks.  his fingerstick range is between .  He has not had hypoglycemic symptoms. He has been following a diabetic diet and has been active.  his last eye exam was greater than a year ago .     Patient has hypertension.  He is currently taking no medication.  He was previously on atenolol years ago.  Blood pressure is not controlled in the office today.  Blood pressure has been running unknown at home.  They are following a low salt diet.  They are active.    Patient does have hyperlipidemia as well.  He is taking pravastatin.  Denies any side effects to the medication.  He does try to monitor his diet.  Weight is slightly increased from last visit.    The following portions of the patient's history were reviewed and updated as appropriate: allergies, current medications, past family history, past medical history, past social history, past surgical history and problem list.     No Known Allergies      Current Outpatient Medications:   •  Blood Glucose Monitoring Suppl (ONE TOUCH ULTRA 2) w/Device kit, USE DAILY, Disp: 1 each, Rfl: 0  •  cetirizine (zyrTEC) 10 MG tablet, Take 10 mg by mouth Daily., Disp: , Rfl:   •  glucose blood test strip, Use as instructed; E.11.9, Disp: 100 each, Rfl: 5  •  ONE TOUCH CLUB LANCETS misc, 1 each Daily. E11.9, Disp: 100 each, Rfl: 5  •  pravastatin (PRAVACHOL) 40 MG tablet, Take 1 tablet by mouth Every Night., Disp: 90 tablet, Rfl: 3  •  losartan (Cozaar) 50 MG tablet, Take 1 tablet by mouth Daily., Disp: 90 tablet, Rfl: 3  •  mesalamine (ASACOL) 800 MG EC tablet, 2 tab po three times daily, Disp: 180 tablet, Rfl: 2  •  mesalamine (ROWASA) 4 g enema, Insert 1 enema into the rectum Every Night., Disp: 30 enema, Rfl:  "1  •  SITagliptin (Januvia) 100 MG tablet, Take 1 tablet by mouth Daily., Disp: 30 tablet, Rfl: 11    ROS    Review of Systems   Constitutional: Negative for chills, fatigue and fever.   Respiratory: Negative for cough and shortness of breath.    Cardiovascular: Negative for chest pain.   Gastrointestinal: Positive for diarrhea (Improved with medication). Negative for constipation, nausea and vomiting.       Vitals:    04/20/21 0837   BP: 140/86   BP Location: Left arm   Patient Position: Sitting   Cuff Size: Adult   Pulse: 72   Temp: 97.7 °F (36.5 °C)   TempSrc: Temporal   SpO2: 98%   Weight: 79.7 kg (175 lb 12.8 oz)   Height: 167.6 cm (66\")     Body mass index is 28.37 kg/m².    Physical Exam     Physical Exam  Constitutional:       General: He is not in acute distress.     Appearance: He is well-developed.   HENT:      Head: Normocephalic and atraumatic.      Right Ear: External ear normal.      Left Ear: External ear normal.   Eyes:      Extraocular Movements: Extraocular movements intact.      Conjunctiva/sclera: Conjunctivae normal.   Cardiovascular:      Rate and Rhythm: Normal rate and regular rhythm.      Heart sounds: No murmur heard.     Pulmonary:      Effort: Pulmonary effort is normal. No respiratory distress.      Breath sounds: Normal breath sounds. No wheezing.   Abdominal:      General: Bowel sounds are normal. There is no distension.      Palpations: Abdomen is soft.      Tenderness: There is no abdominal tenderness.   Skin:     Findings: No erythema.   Neurological:      Mental Status: He is alert and oriented to person, place, and time.      Cranial Nerves: No cranial nerve deficit.   Psychiatric:         Mood and Affect: Mood normal.         Behavior: Behavior normal.         Assessment/Plan    Problems Addressed this Visit        Cardiac and Vasculature    Hypertension     Uncontrolled.  Patient is being started on losartan.  He has been encouraged to monitor blood pressure at home.         " Relevant Medications    losartan (Cozaar) 50 MG tablet    Other Relevant Orders    CBC & Differential (Completed)    Comprehensive Metabolic Panel (Completed)    Hyperlipidemia     Status unknown.  Uncontrolled per last labs.  However, patient has been on pravastatin for several months now.  Will obtain updated lipid panel.         Relevant Medications    pravastatin (PRAVACHOL) 40 MG tablet    Other Relevant Orders    Lipid Panel (Completed)       Endocrine and Metabolic    Type 2 diabetes mellitus without complication, without long-term current use of insulin (CMS/[facility]) - Primary     Uncontrolled with A1c of 7.2.  Will increase Januvia to 100 mg daily.  Patient encouraged to comply with diabetic diet.  Will monitor A1c and microalbumin every few months.  He is on a statin and has been started on an ARB today.         Relevant Orders    POC Glycosylated Hemoglobin (Hb A1C) (Completed)    CBC & Differential (Completed)    Comprehensive Metabolic Panel (Completed)    Microalbumin / Creatinine Urine Ratio - Urine, Clean Catch (Completed)          New Medications Ordered This Visit   Medications   • losartan (Cozaar) 50 MG tablet     Sig: Take 1 tablet by mouth Daily.     Dispense:  90 tablet     Refill:  3   • pravastatin (PRAVACHOL) 40 MG tablet     Sig: Take 1 tablet by mouth Every Night.     Dispense:  90 tablet     Refill:  3       No orders of the defined types were placed in this encounter.      Return in about 3 months (around 7/20/2021) for diabetes, HTN.    Carmen Benitez DO

## 2021-04-21 LAB
ALBUMIN SERPL-MCNC: 4.5 G/DL (ref 3.5–5.2)
ALBUMIN/CREAT UR: 17 MG/G CREAT (ref 0–29)
ALBUMIN/GLOB SERPL: 1.8 G/DL
ALP SERPL-CCNC: 96 U/L (ref 39–117)
ALT SERPL-CCNC: 25 U/L (ref 1–41)
AST SERPL-CCNC: 21 U/L (ref 1–40)
BASOPHILS # BLD AUTO: 0.07 10*3/MM3 (ref 0–0.2)
BASOPHILS NFR BLD AUTO: 1 % (ref 0–1.5)
BILIRUB SERPL-MCNC: 0.5 MG/DL (ref 0–1.2)
BUN SERPL-MCNC: 11 MG/DL (ref 6–20)
BUN/CREAT SERPL: 12 (ref 7–25)
CALCIUM SERPL-MCNC: 9.5 MG/DL (ref 8.6–10.5)
CHLORIDE SERPL-SCNC: 103 MMOL/L (ref 98–107)
CHOLEST SERPL-MCNC: 200 MG/DL (ref 0–200)
CO2 SERPL-SCNC: 28.9 MMOL/L (ref 22–29)
CREAT SERPL-MCNC: 0.92 MG/DL (ref 0.76–1.27)
CREAT UR-MCNC: 52.8 MG/DL
EOSINOPHIL # BLD AUTO: 0.29 10*3/MM3 (ref 0–0.4)
EOSINOPHIL NFR BLD AUTO: 4 % (ref 0.3–6.2)
ERYTHROCYTE [DISTWIDTH] IN BLOOD BY AUTOMATED COUNT: 13 % (ref 12.3–15.4)
GLOBULIN SER CALC-MCNC: 2.5 GM/DL
GLUCOSE SERPL-MCNC: 146 MG/DL (ref 65–99)
HCT VFR BLD AUTO: 48.6 % (ref 37.5–51)
HDLC SERPL-MCNC: 50 MG/DL (ref 40–60)
HGB BLD-MCNC: 16.5 G/DL (ref 13–17.7)
IMM GRANULOCYTES # BLD AUTO: 0.02 10*3/MM3 (ref 0–0.05)
IMM GRANULOCYTES NFR BLD AUTO: 0.3 % (ref 0–0.5)
LDLC SERPL CALC-MCNC: 137 MG/DL (ref 0–100)
LYMPHOCYTES # BLD AUTO: 2.36 10*3/MM3 (ref 0.7–3.1)
LYMPHOCYTES NFR BLD AUTO: 32.2 % (ref 19.6–45.3)
MCH RBC QN AUTO: 30 PG (ref 26.6–33)
MCHC RBC AUTO-ENTMCNC: 34 G/DL (ref 31.5–35.7)
MCV RBC AUTO: 88.4 FL (ref 79–97)
MICROALBUMIN UR-MCNC: 8.9 UG/ML
MONOCYTES # BLD AUTO: 0.62 10*3/MM3 (ref 0.1–0.9)
MONOCYTES NFR BLD AUTO: 8.4 % (ref 5–12)
NEUTROPHILS # BLD AUTO: 3.98 10*3/MM3 (ref 1.7–7)
NEUTROPHILS NFR BLD AUTO: 54.1 % (ref 42.7–76)
NRBC BLD AUTO-RTO: 0 /100 WBC (ref 0–0.2)
PLATELET # BLD AUTO: 355 10*3/MM3 (ref 140–450)
POTASSIUM SERPL-SCNC: 4.5 MMOL/L (ref 3.5–5.2)
PROT SERPL-MCNC: 7 G/DL (ref 6–8.5)
RBC # BLD AUTO: 5.5 10*6/MM3 (ref 4.14–5.8)
SODIUM SERPL-SCNC: 139 MMOL/L (ref 136–145)
TRIGL SERPL-MCNC: 69 MG/DL (ref 0–150)
VLDLC SERPL CALC-MCNC: 13 MG/DL (ref 5–40)
WBC # BLD AUTO: 7.34 10*3/MM3 (ref 3.4–10.8)

## 2021-04-21 RX ORDER — ROSUVASTATIN CALCIUM 20 MG/1
20 TABLET, COATED ORAL DAILY
Qty: 30 TABLET | Refills: 5 | Status: SHIPPED | OUTPATIENT
Start: 2021-04-21 | End: 2021-10-05 | Stop reason: CLARIF

## 2021-04-21 NOTE — TELEPHONE ENCOUNTER
Januvia 100mg was sent yesterday, confirmed with Jenny they had this RX and it was ready. I called and informed pt, he understood.

## 2021-04-21 NOTE — TELEPHONE ENCOUNTER
Caller: Ulices Negron    Relationship: Self    Best call back number: [phone]     Medication needed:   Requested Prescriptions     Pending Prescriptions Disp Refills   • SITagliptin (Januvia) 100 MG tablet 30 tablet 11     Sig: Take 1 tablet by mouth Daily.       When do you need the refill by: TODAY    What additional details did the patient provide when requesting the medication: THE WRONG PRESCRIPTION WAS CALLED IN. IT WAS SUPPOSED TO BE 100MG OF JANUVIA AND NOT 50MG    Does the patient have less than a 3 day supply:  [x] Yes  [] No    What is the patient's preferred pharmacy: [facility] [place] [address] [facility] [phone] [facility] [phone] FX

## 2021-04-21 NOTE — ASSESSMENT & PLAN NOTE
Uncontrolled.  Patient is being started on losartan.  He has been encouraged to monitor blood pressure at home.

## 2021-04-21 NOTE — ASSESSMENT & PLAN NOTE
Uncontrolled with A1c of 7.2.  Will increase Januvia to 100 mg daily.  Patient encouraged to comply with diabetic diet.  Will monitor A1c and microalbumin every few months.  He is on a statin and has been started on an ARB today.

## 2021-04-21 NOTE — ASSESSMENT & PLAN NOTE
Status unknown.  Uncontrolled per last labs.  However, patient has been on pravastatin for several months now.  Will obtain updated lipid panel.

## 2021-05-03 ENCOUNTER — TELEPHONE (OUTPATIENT)
Dept: INTERNAL MEDICINE | Facility: CLINIC | Age: 51
End: 2021-05-03

## 2021-05-03 NOTE — TELEPHONE ENCOUNTER
Caller: Ulices Negron    Relationship: Self    Best call back number: [phone]    What medication are you requesting: N/A    What are your current symptoms: STRESS, ANXIETY     How long have you been experiencing symptoms: N/A     Have you had these symptoms before:    [] Yes  [x] No    Have you been treated for these symptoms before:   [] Yes  [x] No    If a prescription is needed, what is your preferred pharmacy and phone number: [facility] [address] [facility] - [phone] [facility] [phone] FX     Additional notes: PATIENT STATES HE WOULD LIKE A CALL BACK TO DISCUSS FURTHER IN DEPTH WITH A CLINICAL STAFF MEMBER.

## 2021-05-25 NOTE — PROGRESS NOTES
Patient: Ulices Negron    YOB: 1970    Date: 03/12/2018    Primary Care Provider: Lorenzo Bermudez DO    Reason for Consultation: Follow-up lap dangelo    Chief Complaint   Patient presents with   • Post-op     Post op lap dangelo       History of present illness:  I saw the patient in the office today as a followup from their recent laparoscopic cholecystectomy.  The pathology is subacute cholecystitis.  They state that they have done well with little drainage in the drain tube.      The following portions of the patient's history were reviewed and updated as appropriate: allergies, current medications, past family history, past medical history, past social history, past surgical history and problem list.      Vital Signs:   Temp:  [98.4 °F (36.9 °C)] 98.4 °F (36.9 °C)  Heart Rate:  [79] 79    Physical Exam:   General Appearance:    Alert, cooperative, in no acute distress   Abdomen:     no masses, no organomegaly, soft non-tender, non-distended, no guarding, wounds are well healed     Assessment / Plan :    1. Postoperative visit        I did discuss the situation with the patient today in the office and they have done well from their recent laparoscopic cholecystectomy.I removed the Aurelio-Davidson drain today.  I have released the patient back to normal activity in one week, they understand that they need to be careful about heavy lifting.  I need to see the patient back in the office only if they are having further problems, they know to call me if they are.    Electronically signed by Fransisco Rosales MD  03/12/18      Scribed for Fransisco Rosales MD by Michelle Milligan. 3/12/2018  4:11 PM               Price (Do Not Change): 0.00 Detail Level: Simple Instructions: This plan will send the code FBSE to the PM system.  DO NOT or CHANGE the price.

## 2021-10-05 ENCOUNTER — OFFICE VISIT (OUTPATIENT)
Dept: INTERNAL MEDICINE | Facility: CLINIC | Age: 51
End: 2021-10-05

## 2021-10-05 VITALS
TEMPERATURE: 97.7 F | SYSTOLIC BLOOD PRESSURE: 142 MMHG | HEIGHT: 66 IN | HEART RATE: 78 BPM | OXYGEN SATURATION: 99 % | WEIGHT: 182.4 LBS | DIASTOLIC BLOOD PRESSURE: 70 MMHG | BODY MASS INDEX: 29.32 KG/M2

## 2021-10-05 DIAGNOSIS — J06.9 ACUTE URI: ICD-10-CM

## 2021-10-05 DIAGNOSIS — J06.9 ACUTE URI: Primary | ICD-10-CM

## 2021-10-05 DIAGNOSIS — Z20.822 SUSPECTED COVID-19 VIRUS INFECTION: ICD-10-CM

## 2021-10-05 PROCEDURE — 99213 OFFICE O/P EST LOW 20 MIN: CPT | Performed by: NURSE PRACTITIONER

## 2021-10-05 PROCEDURE — U0004 COV-19 TEST NON-CDC HGH THRU: HCPCS | Performed by: NURSE PRACTITIONER

## 2021-10-05 RX ORDER — AMOXICILLIN AND CLAVULANATE POTASSIUM 875; 125 MG/1; MG/1
1 TABLET, FILM COATED ORAL 2 TIMES DAILY
Qty: 14 TABLET | Refills: 0 | Status: SHIPPED | OUTPATIENT
Start: 2021-10-05 | End: 2021-10-12

## 2021-10-05 RX ORDER — PRAVASTATIN SODIUM 40 MG
TABLET ORAL
COMMUNITY
Start: 2021-07-16 | End: 2022-06-10 | Stop reason: SDUPTHER

## 2021-10-05 NOTE — PROGRESS NOTES
"  Office Visit      Patient Name: Ulices Negron  : 1970   MRN: [id number]   Care Team: Patient Care Team:  Carmen Benitez DO as PCP - General (Family Medicine)  Fransisco Rosales MD as Consulting Physician (General Surgery)    Chief Complaint  Sinusitis (sinus pressure, ear pressure x 6 days - some chest congestion, using tylenol and sudafed )    Subjective     Subjective      Ulices Negron presents to [facility] for sinus problem. Symptoms started about 6 days ago. Endorses sinus pressure, bilateral ear pressure, chest congestion, cough, nasal congestion, sneezing, and watery eyes.   Denies headache, fever, body aches, sore throat, nausea, diarrhea, and loss of taste/smell.   Vaccinated for COVID-19 fully as of 2 weeks ago. Has not been tested or exposed that he knows of.  Has tried tylenol and sudafed in addition to daily zyrtec, has not helped much.  He feels like his symptoms are acutely worsening and feels like coldness and lying down into his chest.     Review of Systems   Constitutional: Positive for fatigue. Negative for chills and fever.   HENT: Positive for congestion, rhinorrhea and sinus pressure. Negative for postnasal drip, sneezing, sore throat, swollen glands and trouble swallowing.    Respiratory: Positive for cough. Negative for shortness of breath and wheezing.    Cardiovascular: Negative for chest pain.   Gastrointestinal: Negative for abdominal pain, diarrhea, nausea and vomiting.   Neurological: Negative for headache.   Psychiatric/Behavioral: Negative for sleep disturbance.       Objective     Objective   Vital Signs:   /70   Pulse 78   Temp 97.7 °F (36.5 °C) (Infrared)   Ht 167.6 cm (66\")   Wt 82.7 kg (182 lb 6.4 oz)   SpO2 99%   BMI 29.44 kg/m²     Physical Exam  Vitals and nursing note reviewed.   Constitutional:       General: He is not in acute distress.     Appearance: Normal appearance. He is ill-appearing. He is " not toxic-appearing.   HENT:      Right Ear: Ear canal normal. No middle ear effusion. Tympanic membrane is retracted. Tympanic membrane is not bulging.      Left Ear: Ear canal normal.  No middle ear effusion. Tympanic membrane is retracted. Tympanic membrane is not bulging.      Nose: Congestion present. No rhinorrhea.      Right Sinus: No maxillary sinus tenderness or frontal sinus tenderness.      Left Sinus: No maxillary sinus tenderness or frontal sinus tenderness.      Mouth/Throat:      Mouth: Mucous membranes are moist.      Pharynx: Posterior oropharyngeal erythema (PND) present.   Eyes:      Pupils: Pupils are equal, round, and reactive to light.   Cardiovascular:      Rate and Rhythm: Normal rate and regular rhythm.      Heart sounds: Normal heart sounds. No murmur heard.     Pulmonary:      Effort: Pulmonary effort is normal. No respiratory distress.      Breath sounds: Normal breath sounds. No wheezing.   Abdominal:      General: Bowel sounds are normal. There is no distension.      Palpations: Abdomen is soft.      Tenderness: There is no abdominal tenderness.   Musculoskeletal:      Cervical back: Neck supple. No tenderness.   Lymphadenopathy:      Head:      Right side of head: Tonsillar adenopathy present. No submental or submandibular adenopathy.      Left side of head: Tonsillar adenopathy present. No submental or submandibular adenopathy.      Cervical: No cervical adenopathy.   Skin:     General: Skin is warm and dry.      Findings: No rash.   Neurological:      Mental Status: He is alert.   Psychiatric:         Mood and Affect: Mood normal.         Behavior: Behavior normal.        Assessment / Plan      Assessment/Plan   Problem List Items Addressed This Visit     None      Visit Diagnoses     Acute URI    -  Primary    Relevant Medications    amoxicillin-clavulanate (Augmentin) 875-125 MG per tablet    Other Relevant Orders    COVID-19 PCR, [facility], NP SWAB IN [facility] VIRAL TRANSPORT  MEDIA/ORAL SWISH 24-30 HR TAT - Swab, Nasopharynx    Suspected COVID-19 virus infection        Relevant Orders    COVID-19 PCR, [facility], NP SWAB IN [facility]AL [facility]SPORT MEDIA/ORAL SWISH 24-30 HR TAT - Swab, Nasopharynx    Covid test pending.  Advised to quarantine until results are back and negative, if positive will need to quarantine 10 days from symptom onset and until 72 hours fever free.  Will start Augmentin twice daily with food for URI with acute worsening despite conservative measures.  Advised to use Mucinex twice daily, Tylenol as needed, Sudafed as needed, and continue antihistamine.  Return with worsening or persisting symptoms.  Present to the ED with any shortness of breath or chest pain.           Follow Up   Return if symptoms worsen or fail to improve.  Patient was given instructions and counseling regarding his condition or for health maintenance advice. Please see specific information pulled into the AVS if appropriate.     YENNIFER Cool  [facility]

## 2021-10-06 LAB — SARS-COV-2 RNA NOSE QL NAA+PROBE: NOT DETECTED

## 2021-10-12 ENCOUNTER — OFFICE VISIT (OUTPATIENT)
Dept: INTERNAL MEDICINE | Facility: CLINIC | Age: 51
End: 2021-10-12

## 2021-10-12 VITALS
RESPIRATION RATE: 16 BRPM | TEMPERATURE: 97.9 F | HEIGHT: 66 IN | WEIGHT: 179 LBS | SYSTOLIC BLOOD PRESSURE: 137 MMHG | OXYGEN SATURATION: 100 % | DIASTOLIC BLOOD PRESSURE: 89 MMHG | HEART RATE: 76 BPM | BODY MASS INDEX: 28.77 KG/M2

## 2021-10-12 DIAGNOSIS — H65.91 RIGHT OTITIS MEDIA WITH EFFUSION: Primary | ICD-10-CM

## 2021-10-12 DIAGNOSIS — K51.30 ULCERATIVE PROCTOCOLITIS (HCC): ICD-10-CM

## 2021-10-12 PROCEDURE — 99214 OFFICE O/P EST MOD 30 MIN: CPT | Performed by: INTERNAL MEDICINE

## 2021-10-12 RX ORDER — AZITHROMYCIN 250 MG/1
TABLET, FILM COATED ORAL
Qty: 6 TABLET | Refills: 0 | Status: SHIPPED | OUTPATIENT
Start: 2021-10-12 | End: 2022-06-09

## 2021-10-12 RX ORDER — MESALAMINE 4 G/60ML
4 ENEMA RECTAL NIGHTLY
Qty: 30 ENEMA | Refills: 1 | Status: SHIPPED | OUTPATIENT
Start: 2021-10-12 | End: 2022-10-06

## 2021-10-12 RX ORDER — METHYLPREDNISOLONE 4 MG/1
TABLET ORAL
Qty: 21 EACH | Refills: 0 | Status: SHIPPED | OUTPATIENT
Start: 2021-10-12 | End: 2022-06-09

## 2021-10-12 NOTE — PROGRESS NOTES
Chief Complaint   Patient presents with   • Sinus Problem     sinus congestion, right ear pain, cough and fatigue- has been on Augmentin-last dose today       Subjective     History of Present Illness   Ulices Negron is a 51 y.o. male presenting for right sided ear pain which has been present for the last week.   Associated symptoms include cough, fatigue, and sinus congestion.  Mostly coughing up clear mucus.   melasalamine refills requested, has noticed increased mild rectal breathing.      The following portions of the patient's history were reviewed and updated as appropriate: allergies, current medications, past family history, past medical history, past social history, past surgical history and problem list.    Review of Systems   Constitutional: Negative for chills, fatigue and fever.   HENT: Negative for congestion, ear pain, rhinorrhea, sinus pressure and sore throat.    Eyes: Negative for visual disturbance.   Respiratory: Negative for cough, chest tightness, shortness of breath and wheezing.    Cardiovascular: Negative for chest pain, palpitations and leg swelling.   Gastrointestinal: Negative for abdominal pain, blood in stool, constipation, diarrhea, nausea and vomiting.   Endocrine: Negative for polydipsia and polyuria.   Genitourinary: Negative for dysuria and hematuria.   Musculoskeletal: Negative for arthralgias and back pain.   Skin: Negative for rash.   Neurological: Negative for dizziness, light-headedness, numbness and headaches.   Psychiatric/Behavioral: Negative for dysphoric mood and sleep disturbance. The patient is not nervous/anxious.        No Known Allergies    Past Medical History:   Diagnosis Date   • Diabetes mellitus (HCC)    • Elevated cholesterol    • Hyperlipidemia    • Hypertension        Social History     Socioeconomic History   • Marital status:    Tobacco Use   • Smoking status: Former Smoker     Years: 20.00     Types: Cigarettes     Quit date: 2007     Years  "since quittin.7   • Smokeless tobacco: Never Used   Substance and Sexual Activity   • Alcohol use: No   • Drug use: Never   • Sexual activity: Defer        Past Surgical History:   Procedure Laterality Date   • BACK SURGERY     • CHOLECYSTECTOMY     • COLONOSCOPY      2017-Dr Rosales   • COLONOSCOPY N/A 2020    Procedure: COLONOSCOPY;  Surgeon: Karina Castillo MD;  Location: [facility] ENDOSCOPY;  Service: Gastroenterology;  Laterality: N/A;   • HERNIA REPAIR         Family History   Problem Relation Age of Onset   • Heart attack Maternal Aunt    • Heart disease Maternal Grandmother    • Heart disease Paternal Grandfather          Current Outpatient Medications:   •  Blood Glucose Monitoring Suppl (ONE TOUCH ULTRA 2) w/Device kit, USE DAILY, Disp: 1 each, Rfl: 0  •  cetirizine (zyrTEC) 10 MG tablet, Take 10 mg by mouth Daily., Disp: , Rfl:   •  glucose blood test strip, Use as instructed; E.11.9, Disp: 100 each, Rfl: 5  •  losartan (Cozaar) 50 MG tablet, Take 1 tablet by mouth Daily., Disp: 90 tablet, Rfl: 3  •  mesalamine (ROWASA) 4 g enema, Insert 1 enema into the rectum Every Night., Disp: 30 enema, Rfl: 1  •  ONE TOUCH CLUB LANCETS misc, 1 each Daily. E11.9, Disp: 100 each, Rfl: 5  •  pravastatin (PRAVACHOL) 40 MG tablet, , Disp: , Rfl:   •  SITagliptin (Januvia) 100 MG tablet, Take 1 tablet by mouth Daily., Disp: 30 tablet, Rfl: 11  •  azithromycin (Zithromax Z-Keshawn) 250 MG tablet, Take 2 tablets the first day, then 1 tablet daily for 4 days., Disp: 6 tablet, Rfl: 0  •  methylPREDNISolone (MEDROL) 4 MG dose pack, Take as directed on package instructions., Disp: 21 each, Rfl: 0    Objective   /89   Pulse 76   Temp 97.9 °F (36.6 °C)   Resp 16   Ht 167.6 cm (66\")   Wt 81.2 kg (179 lb)   SpO2 100%   BMI 28.89 kg/m²     Physical Exam  Vitals and nursing note reviewed.   Constitutional:       Appearance: Normal appearance. He is well-developed.   HENT:      Head: Normocephalic and atraumatic. "      Ears:      Comments: Right TM with effusion, distended  Eyes:      Extraocular Movements: Extraocular movements intact.      Conjunctiva/sclera: Conjunctivae normal.   Pulmonary:      Effort: Pulmonary effort is normal.   Musculoskeletal:      Cervical back: Normal range of motion and neck supple.   Skin:     General: Skin is warm and dry.      Findings: No rash.   Neurological:      General: No focal deficit present.      Mental Status: He is alert and oriented to person, place, and time.   Psychiatric:         Mood and Affect: Mood normal.         Behavior: Behavior normal.         Assessment/Plan   Diagnoses and all orders for this visit:    1. Right otitis media with effusion (Primary)  -     methylPREDNISolone (MEDROL) 4 MG dose pack; Take as directed on package instructions.  Dispense: 21 each; Refill: 0  -     azithromycin (Zithromax Z-Keshawn) 250 MG tablet; Take 2 tablets the first day, then 1 tablet daily for 4 days.  Dispense: 6 tablet; Refill: 0    2. Ulcerative proctocolitis (HCC)  -     mesalamine (ROWASA) 4 g enema; Insert 1 enema into the rectum Every Night.  Dispense: 30 enema; Refill: 1          Discussion Summary:  Patient is a 51 y.o. male presenting for Otitis Media.    1. Right Otitis Media / Bacterial Sinusitis  - Start Azithromycin  - Should symptoms not improve in 3-4 days patient advised to contact the clinic.     2. UC  - mesalamine refilled.     Follow up:  Return if symptoms worsen or fail to improve.

## 2022-01-23 PROCEDURE — U0004 COV-19 TEST NON-CDC HGH THRU: HCPCS | Performed by: NURSE PRACTITIONER

## 2022-04-20 RX ORDER — LOSARTAN POTASSIUM 50 MG/1
TABLET ORAL
Qty: 90 TABLET | Refills: 3 | Status: SHIPPED | OUTPATIENT
Start: 2022-04-20 | End: 2023-01-20 | Stop reason: SDUPTHER

## 2022-05-04 DIAGNOSIS — E11.9 TYPE 2 DIABETES MELLITUS WITHOUT COMPLICATION, WITHOUT LONG-TERM CURRENT USE OF INSULIN: Primary | ICD-10-CM

## 2022-05-04 RX ORDER — SITAGLIPTIN 100 MG/1
TABLET, FILM COATED ORAL
Qty: 30 TABLET | Refills: 0 | Status: SHIPPED | OUTPATIENT
Start: 2022-05-04 | End: 2022-06-09 | Stop reason: SDUPTHER

## 2022-05-04 NOTE — TELEPHONE ENCOUNTER
Rx Refill Note  Requested Prescriptions     Pending Prescriptions Disp Refills   • Januvia 100 MG tablet [Pharmacy Med Name: JANUVIA 100 MG TABLET] 30 tablet 11     Sig: TAKE ONE TABLET BY MOUTH DAILY      Last office visit with prescribing clinician: 4/20/2021      Next office visit with prescribing clinician: Visit date not found            CARROLL HENRY MA  05/04/22, 09:45 EDT

## 2022-05-31 DIAGNOSIS — E11.9 TYPE 2 DIABETES MELLITUS WITHOUT COMPLICATION, WITHOUT LONG-TERM CURRENT USE OF INSULIN: ICD-10-CM

## 2022-05-31 RX ORDER — SITAGLIPTIN 100 MG/1
TABLET, FILM COATED ORAL
Qty: 30 TABLET | Refills: 0 | OUTPATIENT
Start: 2022-05-31

## 2022-05-31 NOTE — TELEPHONE ENCOUNTER
Rx Refill Note  Requested Prescriptions     Pending Prescriptions Disp Refills   • Januvia 100 MG tablet [Pharmacy Med Name: JANUVIA 100 MG TABLET] 30 tablet 0     Sig: TAKE ONE TABLET BY MOUTH DAILY      Last office visit with prescribing clinician: 4/20/2021      Next office visit with prescribing clinician: Visit date not found            Bonnie Hernandez MA  05/31/22, 09:57 EDT

## 2022-06-09 ENCOUNTER — OFFICE VISIT (OUTPATIENT)
Dept: INTERNAL MEDICINE | Facility: CLINIC | Age: 52
End: 2022-06-09

## 2022-06-09 VITALS
HEIGHT: 66 IN | TEMPERATURE: 97.5 F | DIASTOLIC BLOOD PRESSURE: 87 MMHG | OXYGEN SATURATION: 100 % | SYSTOLIC BLOOD PRESSURE: 148 MMHG | WEIGHT: 180 LBS | BODY MASS INDEX: 28.93 KG/M2 | HEART RATE: 68 BPM

## 2022-06-09 DIAGNOSIS — I10 ESSENTIAL HYPERTENSION: Primary | ICD-10-CM

## 2022-06-09 DIAGNOSIS — F41.9 ANXIETY: ICD-10-CM

## 2022-06-09 DIAGNOSIS — E78.5 HYPERLIPIDEMIA, UNSPECIFIED HYPERLIPIDEMIA TYPE: ICD-10-CM

## 2022-06-09 DIAGNOSIS — M54.2 NECK PAIN: ICD-10-CM

## 2022-06-09 DIAGNOSIS — E11.9 TYPE 2 DIABETES MELLITUS WITHOUT COMPLICATION, WITHOUT LONG-TERM CURRENT USE OF INSULIN: ICD-10-CM

## 2022-06-09 PROCEDURE — 99214 OFFICE O/P EST MOD 30 MIN: CPT | Performed by: FAMILY MEDICINE

## 2022-06-09 RX ORDER — BUSPIRONE HYDROCHLORIDE 5 MG/1
5 TABLET ORAL 2 TIMES DAILY PRN
Qty: 60 TABLET | Refills: 2 | Status: SHIPPED | OUTPATIENT
Start: 2022-06-09 | End: 2022-10-18

## 2022-06-09 NOTE — PROGRESS NOTES
Ulices Negron is a 52 y.o. male.    Chief Complaint   Patient presents with   • Diabetes   • Neck Pain     Right side, neck pain, when he turns his head he hears like a crunch, and he thinks its a ruptured disk.        HPI   Patient has had diabetes for the past few years. He has been compliant with the medications and denies any side effects from it. He has not been monitoring fingersticks.  his fingerstick range is between uknown.  He has not had hypoglycemic symptoms. He has been following a diabetic diet and has been active.     Patient has hypertension.  They are taking Losartan.  They have been compliant with medications.  The patient denies any side effects to the medication.  Blood pressure is controlled in the office today.  Blood pressure has been running unknown at home.  They are following a low salt diet.  They are active.    Patient also has hyperlipidemia.  He is taking pravastatin.  Tries to follow low cholesterol diet and denies any side effects from the medication.      He does admit to increased stress. Admits to agitation.  Wife would like him to take a medication for mood.   He does feel like better glycemic control has helped his mood.  He admits to right sided neck pain as well.   May be related to stress.   He reports numbness and tingling into right arm that stops in the elbow.     The following portions of the patient's history were reviewed and updated as appropriate: allergies, current medications, past family history, past medical history, past social history, past surgical history and problem list.     No Known Allergies      Current Outpatient Medications:   •  SITagliptin (Januvia) 100 MG tablet, Take 1 tablet by mouth Daily., Disp: 90 tablet, Rfl: 3  •  Blood Glucose Monitoring Suppl (ONE TOUCH ULTRA 2) w/Device kit, USE DAILY, Disp: 1 each, Rfl: 0  •  busPIRone (BUSPAR) 5 MG tablet, Take 1 tablet by mouth 2 (Two) Times a Day As Needed (anxiety)., Disp: 60 tablet, Rfl: 2  •   cetirizine (zyrTEC) 10 MG tablet, Take 10 mg by mouth Daily., Disp: , Rfl:   •  empagliflozin (Jardiance) 25 MG tablet tablet, Take 1 tablet by mouth Daily., Disp: 90 tablet, Rfl: 1  •  glucose blood test strip, Use as instructed; E.11.9, Disp: 100 each, Rfl: 5  •  ipratropium (ATROVENT) 0.03 % nasal spray, 2 sprays into the nostril(s) as directed by provider Every 12 (Twelve) Hours., Disp: 1 each, Rfl: 0  •  losartan (COZAAR) 50 MG tablet, TAKE ONE TABLET BY MOUTH DAILY, Disp: 90 tablet, Rfl: 3  •  mesalamine (ROWASA) 4 g enema, Insert 1 enema into the rectum Every Night., Disp: 30 enema, Rfl: 1  •  ONE TOUCH CLUB LANCETS misc, 1 each Daily. E11.9, Disp: 100 each, Rfl: 5  •  pravastatin (PRAVACHOL) 80 MG tablet, Take 1 tablet by mouth Every Night., Disp: 90 tablet, Rfl: 1  •  promethazine-dextromethorphan (PROMETHAZINE-DM) 6.25-15 MG/5ML syrup, Take 1 to 2 teaspoons twice daily as needed for cough, Disp: 118 mL, Rfl: 0  •  triamcinolone (KENALOG) 0.1 % cream, Apply 1 application topically to the appropriate area as directed 2 (Two) Times a Day., Disp: 45 g, Rfl: 1    ROS    Review of Systems   Constitutional: Negative for chills, fatigue and fever.   HENT: Positive for rhinorrhea. Negative for congestion.    Respiratory: Negative for cough and shortness of breath.    Cardiovascular: Positive for chest pain (twinge).   Gastrointestinal: Negative for abdominal pain, constipation, diarrhea, nausea and vomiting.   Musculoskeletal: Positive for neck pain.   Skin: Negative for color change and rash.   Neurological: Positive for numbness. Negative for weakness and headache.   Hematological: Does not bruise/bleed easily.   Psychiatric/Behavioral: Negative for depressed mood. The patient is not nervous/anxious.        Vitals:    06/09/22 0854 06/09/22 0902   BP: (!) 115/103 148/87   BP Location:  Right arm   Patient Position:  Sitting   Cuff Size:  Adult   Pulse: 68    Temp: 97.5 °F (36.4 °C)    SpO2: 100%    Weight: 81.6  "kg (180 lb)    Height: 167.6 cm (66\")      Body mass index is 29.05 kg/m².    Physical Exam     Physical Exam  Constitutional:       General: He is not in acute distress.     Appearance: Normal appearance. He is well-developed.   HENT:      Head: Normocephalic and atraumatic.      Right Ear: External ear normal.      Left Ear: External ear normal.   Eyes:      Extraocular Movements: Extraocular movements intact.      Conjunctiva/sclera: Conjunctivae normal.   Cardiovascular:      Rate and Rhythm: Normal rate and regular rhythm.      Heart sounds: No murmur heard.  Pulmonary:      Effort: Pulmonary effort is normal. No respiratory distress.      Breath sounds: Normal breath sounds. No wheezing.   Abdominal:      General: Bowel sounds are normal. There is no distension.      Palpations: Abdomen is soft.      Tenderness: There is no abdominal tenderness.   Musculoskeletal:      Cervical back: Spasms present. Decreased range of motion.      Right lower leg: No edema.      Left lower leg: No edema.   Skin:     General: Skin is warm and dry.   Neurological:      Mental Status: He is alert and oriented to person, place, and time.      Cranial Nerves: No cranial nerve deficit.   Psychiatric:         Mood and Affect: Mood normal.         Behavior: Behavior normal.         Assessment/Plan    Problems Addressed this Visit     Essential hypertension - Primary     Mildly elevated in office.  Continue losartan.  If remains elevated, will increase dosage.            Relevant Orders    CBC & Differential (Completed)    Comprehensive Metabolic Panel (Completed)    Hyperlipidemia     Continue pravastatin.  Obtain updated lipid panel today.            Relevant Orders    Lipid Panel (Completed)    Type 2 diabetes mellitus without complication, without long-term current use of insulin (HCC)     Uncontrolled.    Continue januvia and obtain updated A1C today.  Will monitor A1c and microalbumin every few months.  He is on a statin and ARB. "            Relevant Medications    SITagliptin (Januvia) 100 MG tablet    Other Relevant Orders    Hemoglobin A1c (Completed)    Microalbumin / Creatinine Urine Ratio - Urine, Clean Catch    Neck pain     Likely secondary to muscle tension from increased anxiety.            Anxiety     Uncontrolled.  Will start buspar.  Discussed how medication may be taken and potential side effects of the medication.                New Medications Ordered This Visit   Medications   • SITagliptin (Januvia) 100 MG tablet     Sig: Take 1 tablet by mouth Daily.     Dispense:  90 tablet     Refill:  3     NEEDS AN APPOINTMENT FOR FURTHER REFILLS   • busPIRone (BUSPAR) 5 MG tablet     Sig: Take 1 tablet by mouth 2 (Two) Times a Day As Needed (anxiety).     Dispense:  60 tablet     Refill:  2       No orders of the defined types were placed in this encounter.      Return in about 1 month (around 7/9/2022) for HTN, anxiety.    Carmen Benitez, DO

## 2022-06-10 LAB
ALBUMIN SERPL-MCNC: 4.4 G/DL (ref 3.5–5.2)
ALBUMIN/CREAT UR: 36 MG/G CREAT (ref 0–29)
ALBUMIN/GLOB SERPL: 1.5 G/DL
ALP SERPL-CCNC: 101 U/L (ref 39–117)
ALT SERPL-CCNC: 31 U/L (ref 1–41)
AST SERPL-CCNC: 20 U/L (ref 1–40)
BASOPHILS # BLD AUTO: 0.07 10*3/MM3 (ref 0–0.2)
BASOPHILS NFR BLD AUTO: 0.9 % (ref 0–1.5)
BILIRUB SERPL-MCNC: 0.6 MG/DL (ref 0–1.2)
BUN SERPL-MCNC: 10 MG/DL (ref 6–20)
BUN/CREAT SERPL: 9.5 (ref 7–25)
CALCIUM SERPL-MCNC: 9.6 MG/DL (ref 8.6–10.5)
CHLORIDE SERPL-SCNC: 101 MMOL/L (ref 98–107)
CHOLEST SERPL-MCNC: 187 MG/DL (ref 0–200)
CO2 SERPL-SCNC: 27.1 MMOL/L (ref 22–29)
CREAT SERPL-MCNC: 1.05 MG/DL (ref 0.76–1.27)
CREAT UR-MCNC: 47.3 MG/DL
EGFRCR SERPLBLD CKD-EPI 2021: 86.5 ML/MIN/1.73
EOSINOPHIL # BLD AUTO: 0.27 10*3/MM3 (ref 0–0.4)
EOSINOPHIL NFR BLD AUTO: 3.5 % (ref 0.3–6.2)
ERYTHROCYTE [DISTWIDTH] IN BLOOD BY AUTOMATED COUNT: 13 % (ref 12.3–15.4)
GLOBULIN SER CALC-MCNC: 2.9 GM/DL
GLUCOSE SERPL-MCNC: 178 MG/DL (ref 65–99)
HBA1C MFR BLD: 8.6 % (ref 4.8–5.6)
HCT VFR BLD AUTO: 47.8 % (ref 37.5–51)
HDLC SERPL-MCNC: 53 MG/DL (ref 40–60)
HGB BLD-MCNC: 16.3 G/DL (ref 13–17.7)
IMM GRANULOCYTES # BLD AUTO: 0.01 10*3/MM3 (ref 0–0.05)
IMM GRANULOCYTES NFR BLD AUTO: 0.1 % (ref 0–0.5)
LDLC SERPL CALC-MCNC: 122 MG/DL (ref 0–100)
LYMPHOCYTES # BLD AUTO: 2.49 10*3/MM3 (ref 0.7–3.1)
LYMPHOCYTES NFR BLD AUTO: 32.7 % (ref 19.6–45.3)
MCH RBC QN AUTO: 30.1 PG (ref 26.6–33)
MCHC RBC AUTO-ENTMCNC: 34.1 G/DL (ref 31.5–35.7)
MCV RBC AUTO: 88.2 FL (ref 79–97)
MICROALBUMIN UR-MCNC: 16.8 UG/ML
MONOCYTES # BLD AUTO: 0.72 10*3/MM3 (ref 0.1–0.9)
MONOCYTES NFR BLD AUTO: 9.4 % (ref 5–12)
NEUTROPHILS # BLD AUTO: 4.06 10*3/MM3 (ref 1.7–7)
NEUTROPHILS NFR BLD AUTO: 53.4 % (ref 42.7–76)
NRBC BLD AUTO-RTO: 0 /100 WBC (ref 0–0.2)
PLATELET # BLD AUTO: 333 10*3/MM3 (ref 140–450)
POTASSIUM SERPL-SCNC: 4.8 MMOL/L (ref 3.5–5.2)
PROT SERPL-MCNC: 7.3 G/DL (ref 6–8.5)
RBC # BLD AUTO: 5.42 10*6/MM3 (ref 4.14–5.8)
SODIUM SERPL-SCNC: 139 MMOL/L (ref 136–145)
TRIGL SERPL-MCNC: 63 MG/DL (ref 0–150)
VLDLC SERPL CALC-MCNC: 12 MG/DL (ref 5–40)
WBC # BLD AUTO: 7.62 10*3/MM3 (ref 3.4–10.8)

## 2022-06-10 RX ORDER — PRAVASTATIN SODIUM 80 MG/1
80 TABLET ORAL NIGHTLY
Qty: 90 TABLET | Refills: 1 | Status: SHIPPED | OUTPATIENT
Start: 2022-06-10 | End: 2022-12-20

## 2022-06-24 ENCOUNTER — OFFICE VISIT (OUTPATIENT)
Dept: INTERNAL MEDICINE | Facility: CLINIC | Age: 52
End: 2022-06-24

## 2022-06-24 VITALS
OXYGEN SATURATION: 97 % | BODY MASS INDEX: 28.45 KG/M2 | WEIGHT: 177 LBS | TEMPERATURE: 98.4 F | HEART RATE: 74 BPM | DIASTOLIC BLOOD PRESSURE: 80 MMHG | HEIGHT: 66 IN | SYSTOLIC BLOOD PRESSURE: 120 MMHG

## 2022-06-24 DIAGNOSIS — J06.9 VIRAL URI: ICD-10-CM

## 2022-06-24 DIAGNOSIS — R05.9 COUGH: ICD-10-CM

## 2022-06-24 DIAGNOSIS — L23.7 POISON IVY DERMATITIS: ICD-10-CM

## 2022-06-24 PROBLEM — R19.7 BLOODY DIARRHEA: Status: RESOLVED | Noted: 2020-07-24 | Resolved: 2022-06-24

## 2022-06-24 PROBLEM — R05.8 COUGH DUE TO ACE INHIBITOR: Status: ACTIVE | Noted: 2022-06-24

## 2022-06-24 PROBLEM — T46.4X5A COUGH DUE TO ACE INHIBITOR: Status: ACTIVE | Noted: 2022-06-24

## 2022-06-24 LAB
EXPIRATION DATE: NORMAL
FLUAV AG UPPER RESP QL IA.RAPID: NOT DETECTED
FLUBV AG UPPER RESP QL IA.RAPID: NOT DETECTED
INTERNAL CONTROL: NORMAL
Lab: NORMAL
SARS-COV-2 AG UPPER RESP QL IA.RAPID: NOT DETECTED

## 2022-06-24 PROCEDURE — 99214 OFFICE O/P EST MOD 30 MIN: CPT | Performed by: INTERNAL MEDICINE

## 2022-06-24 PROCEDURE — 87428 SARSCOV & INF VIR A&B AG IA: CPT | Performed by: INTERNAL MEDICINE

## 2022-06-24 RX ORDER — DEXTROMETHORPHAN HYDROBROMIDE AND PROMETHAZINE HYDROCHLORIDE 15; 6.25 MG/5ML; MG/5ML
SYRUP ORAL
Qty: 118 ML | Refills: 0 | Status: SHIPPED | OUTPATIENT
Start: 2022-06-24

## 2022-06-24 RX ORDER — IPRATROPIUM BROMIDE 21 UG/1
2 SPRAY, METERED NASAL EVERY 12 HOURS
Qty: 1 EACH | Refills: 0 | Status: SHIPPED | OUTPATIENT
Start: 2022-06-24

## 2022-06-24 RX ORDER — TRIAMCINOLONE ACETONIDE 1 MG/G
1 CREAM TOPICAL 2 TIMES DAILY
Qty: 45 G | Refills: 1 | Status: SHIPPED | OUTPATIENT
Start: 2022-06-24

## 2022-06-24 NOTE — PROGRESS NOTES
"Chief Complaint   Patient presents with   • Abstract     Sinus pressure, drainage, sore throat, and cough since yesterday.      Subjective   Ulices Negron is a 52 y.o. male.     Has had 2 COVID vaccines but no booster.   No ill contacts.     He also has a rash on legs and hands.  It is itchy.  Was cleaning fence row of out of weeds and noted rash a few days later.      URI   This is a new problem. The current episode started yesterday. Associated symptoms include congestion, coughing, a rash, rhinorrhea, sinus pain and a sore throat. Pertinent negatives include no headaches, nausea, plugged ear sensation, vomiting or wheezing. Associated symptoms comments: PND. Treatments tried: mucinex. The treatment provided mild relief.        The following portions of the patient's history were reviewed and updated as appropriate: allergies, current medications, past family history, past medical history, past social history, past surgical history and problem list.    Review of Systems   Constitutional: Negative for chills and fever.   HENT: Positive for congestion, postnasal drip, rhinorrhea, sinus pain and sore throat.    Respiratory: Positive for cough. Negative for shortness of breath and wheezing.    Gastrointestinal: Negative for nausea and vomiting.   Skin: Positive for rash.   Neurological: Negative for headaches.       Objective   /80   Pulse 74   Temp 98.4 °F (36.9 °C)   Ht 167.6 cm (66\")   Wt 80.3 kg (177 lb)   SpO2 97%   BMI 28.57 kg/m²   Body mass index is 28.57 kg/m².  Physical Exam  Vitals and nursing note reviewed.   Constitutional:       General: He is not in acute distress.     Appearance: Normal appearance. He is not ill-appearing.      Comments: Very kind and pleasant man in no distress today   HENT:      Head: Normocephalic and atraumatic.      Right Ear: Tympanic membrane, ear canal and external ear normal.      Left Ear: Tympanic membrane, ear canal and external ear normal.      Nose: " Rhinorrhea present. No congestion.      Comments: No sinus tenderness     Mouth/Throat:      Pharynx: No posterior oropharyngeal erythema.      Comments: Red tracks noted from postnasal drip with clear postnasal drip  Eyes:      General:         Right eye: No discharge.         Left eye: No discharge.      Extraocular Movements: Extraocular movements intact.   Cardiovascular:      Rate and Rhythm: Normal rate and regular rhythm.      Heart sounds: Normal heart sounds. No murmur heard.  Pulmonary:      Effort: Pulmonary effort is normal. No respiratory distress.      Breath sounds: Normal breath sounds.   Musculoskeletal:      Cervical back: No tenderness.   Lymphadenopathy:      Cervical: No cervical adenopathy.   Skin:     Findings: Rash present.      Comments: Patient has erythematous papular rash scattered over lower legs and forearms, some are in clusters and there are a few whip lesions over lower legs   Neurological:      General: No focal deficit present.      Mental Status: He is alert and oriented to person, place, and time.   Psychiatric:         Mood and Affect: Mood normal.         Behavior: Behavior normal.         Judgment: Judgment normal.         Assessment & Plan   Ulices Negron is here today and the following problems have been addressed:      Diagnoses and all orders for this visit:    1. Viral URI    2. Poison ivy dermatitis    3. Cough  -     POCT SARS-CoV-2 Antigen JOAN + Flu    Other orders  -     ipratropium (ATROVENT) 0.03 % nasal spray; 2 sprays into the nostril(s) as directed by provider Every 12 (Twelve) Hours.  Dispense: 1 each; Refill: 0  -     promethazine-dextromethorphan (PROMETHAZINE-DM) 6.25-15 MG/5ML syrup; Take 1 to 2 teaspoons twice daily as needed for cough  Dispense: 118 mL; Refill: 0  -     triamcinolone (KENALOG) 0.1 % cream; Apply 1 application topically to the appropriate area as directed 2 (Two) Times a Day.  Dispense: 45 g; Refill: 1    COVID test and flu test are  negative  Symptoms are consistent with viral URI  Patient provided with Atrovent nasal spray, recommend 2 squirts each nostril twice daily to prevent postnasal drip  Provided with cough syrup to use primarily at night, recommend plain Mucinex in a.m.  He also has what appears to be poison ivy dermatitis and was provided with triamcinolone cream to use 2-3 times daily  Increase fluids and rest  He was told if URI symptoms worsen over next 4 to 5 days he may contact me and I will send in prescription for amoxicillin 875 mg twice daily for 10 days for sinus symptoms    Return to clinic as needed if symptoms worsen or persist    Please note that portions of this note were completed with a voice recognition program.  Efforts were made to edit dictation, but occasionally words are mistranscribed.

## 2022-06-26 PROBLEM — F41.9 ANXIETY: Status: ACTIVE | Noted: 2022-06-26

## 2022-06-26 PROBLEM — M54.2 NECK PAIN: Status: ACTIVE | Noted: 2022-06-26

## 2022-06-27 ENCOUNTER — TELEPHONE (OUTPATIENT)
Dept: INTERNAL MEDICINE | Facility: CLINIC | Age: 52
End: 2022-06-27

## 2022-06-27 RX ORDER — AMOXICILLIN 875 MG/1
875 TABLET, COATED ORAL EVERY 12 HOURS SCHEDULED
Qty: 20 TABLET | Refills: 0 | Status: SHIPPED | OUTPATIENT
Start: 2022-06-27 | End: 2023-02-24

## 2022-06-27 NOTE — ASSESSMENT & PLAN NOTE
Uncontrolled.  Will start buspar.  Discussed how medication may be taken and potential side effects of the medication.

## 2022-06-27 NOTE — TELEPHONE ENCOUNTER
Caller: Ulices Negron    Relationship: Self    Best call back number: [phone]    What medication are you requesting: ANTIBOTIC    What are your current symptoms: VERY GREEN CONGESTION, SORE THROAT, GOING INTO HIS HEAD AND CHEST    How long have you been experiencing symptoms: 5 DAYS    Have you had these symptoms before:    [x] Yes  [] No    Have you been treated for these symptoms before:   [x] Yes  [] No    If a prescription is needed, what is your preferred [place] and [place] [address] [address] [facility] [phone] [facility] [phone]      Additional notes: PATIENT SAW DR VERMEESCH ON Friday AND WAS TOLD TO CALL IN IF HIS SYMPTOMS WORSENED

## 2022-06-27 NOTE — TELEPHONE ENCOUNTER
CACHORRO, in my last note at the bottom it stated amoxicillin for 10 days.  Just for future, if you look in my note it will often say what my plan is so we can speed process along!  I already sent it in.

## 2022-06-27 NOTE — ASSESSMENT & PLAN NOTE
Uncontrolled.    Continue januvia and obtain updated A1C today.  Will monitor A1c and microalbumin every few months.  He is on a statin and ARB.

## 2022-10-06 DIAGNOSIS — K51.30 ULCERATIVE PROCTOCOLITIS: ICD-10-CM

## 2022-10-06 RX ORDER — MESALAMINE 4 G/60ML
4 ENEMA RECTAL NIGHTLY
Qty: 30 ENEMA | Refills: 0 | Status: SHIPPED | OUTPATIENT
Start: 2022-10-06

## 2022-10-06 NOTE — TELEPHONE ENCOUNTER
Rx Refill Note  Requested Prescriptions     Pending Prescriptions Disp Refills   • mesalamine (ROWASA) 4 g enema [Pharmacy Med Name: MESALAMINE 4 GM/60 ML ENEMA]       Sig: INSERT 1 ENEMA INTO THE RECTUM EVERY NIGHT      Last office visit with prescribing clinician: 6/24/22    Next office visit with prescribing clinician: Visit date not found          {TIP  Is Refill Pharmacy correct?:23}  Mitch Rosado MA  10/06/22, 10:46 EDT

## 2022-10-18 RX ORDER — BUSPIRONE HYDROCHLORIDE 5 MG/1
TABLET ORAL
Qty: 40 TABLET | Refills: 0 | Status: SHIPPED | OUTPATIENT
Start: 2022-10-18 | End: 2022-11-15

## 2022-10-18 NOTE — TELEPHONE ENCOUNTER
Rx Refill Note  Requested Prescriptions     Pending Prescriptions Disp Refills   • busPIRone (BUSPAR) 5 MG tablet [Pharmacy Med Name: busPIRone HCL 5 MG TABLET] 40 tablet      Sig: TAKE ONE TABLET BY MOUTH TWICE A DAY AS NEEDED (ANXIETY)      Last office visit with prescribing clinician: 6/9/2022      Next office visit with prescribing clinician: Visit date not found            Bonnie Hernandez MA  10/18/22, 15:18 EDT

## 2022-11-15 RX ORDER — BUSPIRONE HYDROCHLORIDE 5 MG/1
TABLET ORAL
Qty: 40 TABLET | Refills: 0 | Status: SHIPPED | OUTPATIENT
Start: 2022-11-15 | End: 2022-12-20

## 2022-12-20 RX ORDER — BUSPIRONE HYDROCHLORIDE 5 MG/1
TABLET ORAL
Qty: 40 TABLET | Refills: 0 | Status: SHIPPED | OUTPATIENT
Start: 2022-12-20

## 2022-12-20 RX ORDER — PRAVASTATIN SODIUM 80 MG/1
TABLET ORAL
Qty: 90 TABLET | Refills: 1 | Status: SHIPPED | OUTPATIENT
Start: 2022-12-20 | End: 2023-01-20 | Stop reason: SDUPTHER

## 2022-12-20 RX ORDER — EMPAGLIFLOZIN 25 MG/1
TABLET, FILM COATED ORAL
Qty: 30 TABLET | Refills: 0 | Status: SHIPPED | OUTPATIENT
Start: 2022-12-20 | End: 2023-01-20 | Stop reason: SDUPTHER

## 2022-12-20 NOTE — TELEPHONE ENCOUNTER
Rx Refill Note  Requested Prescriptions     Pending Prescriptions Disp Refills   • pravastatin (PRAVACHOL) 80 MG tablet [Pharmacy Med Name: PRAVASTATIN SODIUM 80 MG TAB] 90 tablet 1     Sig: TAKE ONE TABLET BY MOUTH ONCE NIGHTLY   • Jardiance 25 MG tablet tablet [Pharmacy Med Name: JARDIANCE 25 MG TABLET] 30 tablet      Sig: TAKE ONE TABLET BY MOUTH DAILY   • busPIRone (BUSPAR) 5 MG tablet [Pharmacy Med Name: busPIRone HCL 5 MG TABLET] 40 tablet 0     Sig: TAKE ONE TABLET BY MOUTH TWICE A DAY AS NEEDED FOR ANXIETY      Last office visit with prescribing clinician: 6/9/2022   Last telemedicine visit with prescribing clinician: Visit date not found   Next office visit with prescribing clinician: Visit date not found                         Would you like a call back once the refill request has been completed: [] Yes [] No    If the office needs to give you a call back, can they leave a voicemail: [] Yes [] No    Bonnie Hernandez MA  12/20/22, 11:56 EST

## 2022-12-22 ENCOUNTER — OFFICE VISIT (OUTPATIENT)
Dept: INTERNAL MEDICINE | Facility: CLINIC | Age: 52
End: 2022-12-22

## 2022-12-22 VITALS
HEART RATE: 97 BPM | HEIGHT: 66 IN | SYSTOLIC BLOOD PRESSURE: 120 MMHG | OXYGEN SATURATION: 95 % | BODY MASS INDEX: 27.77 KG/M2 | WEIGHT: 172.8 LBS | TEMPERATURE: 97.5 F | DIASTOLIC BLOOD PRESSURE: 80 MMHG

## 2022-12-22 DIAGNOSIS — B02.9 HERPES ZOSTER WITHOUT COMPLICATION: Primary | ICD-10-CM

## 2022-12-22 PROCEDURE — 99213 OFFICE O/P EST LOW 20 MIN: CPT | Performed by: INTERNAL MEDICINE

## 2022-12-22 RX ORDER — VALACYCLOVIR HYDROCHLORIDE 1 G/1
TABLET, FILM COATED ORAL
COMMUNITY
Start: 2022-12-19

## 2022-12-22 NOTE — PROGRESS NOTES
Subjective   Ulices Negron is a 52 y.o. male.     Chief Complaint   Patient presents with   • Rash     Back and under arm and on side of face        History of Present Illness   2 week right chest and shoulder rash blistery. Mild pain. Went to [facility] clinic started on valtrex. Patient c/o nausea taking med. Rash improved.     Current Outpatient Medications:   •  Blood Glucose Monitoring Suppl (ONE TOUCH ULTRA 2) w/Device kit, USE DAILY, Disp: 1 each, Rfl: 0  •  busPIRone (BUSPAR) 5 MG tablet, TAKE ONE TABLET BY MOUTH TWICE A DAY AS NEEDED FOR ANXIETY, Disp: 40 tablet, Rfl: 0  •  cetirizine (zyrTEC) 10 MG tablet, Take 10 mg by mouth Daily., Disp: , Rfl:   •  glucose blood test strip, Use as instructed; E.11.9, Disp: 100 each, Rfl: 5  •  ipratropium (ATROVENT) 0.03 % nasal spray, 2 sprays into the nostril(s) as directed by provider Every 12 (Twelve) Hours., Disp: 1 each, Rfl: 0  •  Jardiance 25 MG tablet tablet, TAKE ONE TABLET BY MOUTH DAILY, Disp: 30 tablet, Rfl: 0  •  losartan (COZAAR) 50 MG tablet, TAKE ONE TABLET BY MOUTH DAILY, Disp: 90 tablet, Rfl: 3  •  ONE TOUCH CLUB LANCETS misc, 1 each Daily. E11.9, Disp: 100 each, Rfl: 5  •  pravastatin (PRAVACHOL) 80 MG tablet, TAKE ONE TABLET BY MOUTH ONCE NIGHTLY, Disp: 90 tablet, Rfl: 1  •  SITagliptin (Januvia) 100 MG tablet, Take 1 tablet by mouth Daily., Disp: 90 tablet, Rfl: 3  •  valACYclovir (VALTREX) 1000 MG tablet, , Disp: , Rfl:   •  amoxicillin (AMOXIL) 875 MG tablet, Take 1 tablet by mouth Every 12 (Twelve) Hours., Disp: 20 tablet, Rfl: 0  •  mesalamine (ROWASA) 4 g enema, INSERT 1 ENEMA INTO THE RECTUM EVERY NIGHT, Disp: 30 enema, Rfl: 0  •  promethazine-dextromethorphan (PROMETHAZINE-DM) 6.25-15 MG/5ML syrup, Take 1 to 2 teaspoons twice daily as needed for cough, Disp: 118 mL, Rfl: 0  •  triamcinolone (KENALOG) 0.1 % cream, Apply 1 application topically to the appropriate area as directed 2 (Two) Times a Day., Disp: 45 g, Rfl: 1    The following  portions of the patient's history were reviewed and updated as appropriate: allergies, current medications, past family history, past medical history, past social history, past surgical history and problem list.    Review of Systems   Skin: Positive for rash.       Objective   Physical Exam  Skin:     Findings: Rash ( vescicular rash right shoulder and chest ) present.         All tests have been reviewed.    Assessment & Plan   Diagnoses and all orders for this visit:    Herpes zoster without complication    Other orders  -     valACYclovir (VALTREX) 1000 MG tablet      Ibuprofen start, if severe nausea patient may d/c valtrex

## 2023-01-20 DIAGNOSIS — E11.9 TYPE 2 DIABETES MELLITUS WITHOUT COMPLICATION, WITHOUT LONG-TERM CURRENT USE OF INSULIN: ICD-10-CM

## 2023-01-20 RX ORDER — LOSARTAN POTASSIUM 50 MG/1
50 TABLET ORAL DAILY
Qty: 90 TABLET | Refills: 0 | Status: SHIPPED | OUTPATIENT
Start: 2023-01-20

## 2023-01-20 RX ORDER — PRAVASTATIN SODIUM 80 MG/1
80 TABLET ORAL NIGHTLY
Qty: 90 TABLET | Refills: 0 | Status: SHIPPED | OUTPATIENT
Start: 2023-01-20

## 2023-01-20 NOTE — TELEPHONE ENCOUNTER
Caller: Ulices Negron    Relationship: Self    Best call back number: [phone]  Requested Prescriptions:   Requested Prescriptions     Pending Prescriptions Disp Refills   • losartan (COZAAR) 50 MG tablet 90 tablet 3     Sig: Take 1 tablet by mouth Daily.   • empagliflozin (Jardiance) 25 MG tablet tablet 30 tablet 0     Sig: Take 1 tablet by mouth Daily.   • pravastatin (PRAVACHOL) 80 MG tablet 90 tablet 1     Sig: Take 1 tablet by mouth Every Night.   • SITagliptin (Januvia) 100 MG tablet 90 tablet 3     Sig: Take 1 tablet by mouth Daily.        Pharmacy where request should be sent: [facility] [id number] [address] AT [facility] [phone] [facility] [phone]      Additional details provided by patient: OUT OF MEDICATION     Does the patient have less than a 3 day supply:  [x] Yes  [] No    Would you like a call back once the refill request has been completed: [] Yes [x] No    If the office needs to give you a call back, can they leave a voicemail: [] Yes [x] No    Austyn Fatima Rep   01/20/23 09:36 EST

## 2023-02-24 ENCOUNTER — OFFICE VISIT (OUTPATIENT)
Dept: INTERNAL MEDICINE | Facility: CLINIC | Age: 53
End: 2023-02-24
Payer: COMMERCIAL

## 2023-02-24 VITALS
HEART RATE: 70 BPM | OXYGEN SATURATION: 99 % | HEIGHT: 66 IN | TEMPERATURE: 97.3 F | RESPIRATION RATE: 15 BRPM | WEIGHT: 169 LBS | DIASTOLIC BLOOD PRESSURE: 80 MMHG | SYSTOLIC BLOOD PRESSURE: 128 MMHG | BODY MASS INDEX: 27.16 KG/M2

## 2023-02-24 DIAGNOSIS — I10 ESSENTIAL HYPERTENSION: ICD-10-CM

## 2023-02-24 DIAGNOSIS — E78.5 HYPERLIPIDEMIA, UNSPECIFIED HYPERLIPIDEMIA TYPE: ICD-10-CM

## 2023-02-24 DIAGNOSIS — E11.9 TYPE 2 DIABETES MELLITUS WITHOUT COMPLICATION, WITHOUT LONG-TERM CURRENT USE OF INSULIN: Primary | ICD-10-CM

## 2023-02-24 DIAGNOSIS — K21.9 GASTROESOPHAGEAL REFLUX DISEASE WITHOUT ESOPHAGITIS: ICD-10-CM

## 2023-02-24 DIAGNOSIS — F41.9 ANXIETY: ICD-10-CM

## 2023-02-24 LAB
ALBUMIN SERPL-MCNC: 4.7 G/DL (ref 3.5–5.2)
ALBUMIN/GLOB SERPL: 1.9 G/DL
ALP SERPL-CCNC: 101 U/L (ref 39–117)
ALT SERPL-CCNC: 30 U/L (ref 1–41)
AST SERPL-CCNC: 19 U/L (ref 1–40)
BASOPHILS # BLD AUTO: 0.06 10*3/MM3 (ref 0–0.2)
BASOPHILS NFR BLD AUTO: 0.9 % (ref 0–1.5)
BILIRUB SERPL-MCNC: 0.5 MG/DL (ref 0–1.2)
BUN SERPL-MCNC: 20 MG/DL (ref 6–20)
BUN/CREAT SERPL: 20.4 (ref 7–25)
CALCIUM SERPL-MCNC: 9.7 MG/DL (ref 8.6–10.5)
CHLORIDE SERPL-SCNC: 106 MMOL/L (ref 98–107)
CHOLEST SERPL-MCNC: 168 MG/DL (ref 0–200)
CO2 SERPL-SCNC: 29.5 MMOL/L (ref 22–29)
CREAT SERPL-MCNC: 0.98 MG/DL (ref 0.76–1.27)
EGFRCR SERPLBLD CKD-EPI 2021: 93.9 ML/MIN/1.73
EOSINOPHIL # BLD AUTO: 0.24 10*3/MM3 (ref 0–0.4)
EOSINOPHIL NFR BLD AUTO: 3.7 % (ref 0.3–6.2)
ERYTHROCYTE [DISTWIDTH] IN BLOOD BY AUTOMATED COUNT: 13.2 % (ref 12.3–15.4)
GLOBULIN SER CALC-MCNC: 2.5 GM/DL
GLUCOSE SERPL-MCNC: 127 MG/DL (ref 65–99)
HBA1C MFR BLD: 5.9 % (ref 4.8–5.6)
HCT VFR BLD AUTO: 48.6 % (ref 37.5–51)
HDLC SERPL-MCNC: 62 MG/DL (ref 40–60)
HGB BLD-MCNC: 16.5 G/DL (ref 13–17.7)
IMM GRANULOCYTES # BLD AUTO: 0.01 10*3/MM3 (ref 0–0.05)
IMM GRANULOCYTES NFR BLD AUTO: 0.2 % (ref 0–0.5)
LDLC SERPL CALC-MCNC: 96 MG/DL (ref 0–100)
LYMPHOCYTES # BLD AUTO: 1.97 10*3/MM3 (ref 0.7–3.1)
LYMPHOCYTES NFR BLD AUTO: 30 % (ref 19.6–45.3)
MCH RBC QN AUTO: 30.3 PG (ref 26.6–33)
MCHC RBC AUTO-ENTMCNC: 34 G/DL (ref 31.5–35.7)
MCV RBC AUTO: 89.2 FL (ref 79–97)
MONOCYTES # BLD AUTO: 0.58 10*3/MM3 (ref 0.1–0.9)
MONOCYTES NFR BLD AUTO: 8.8 % (ref 5–12)
NEUTROPHILS # BLD AUTO: 3.71 10*3/MM3 (ref 1.7–7)
NEUTROPHILS NFR BLD AUTO: 56.4 % (ref 42.7–76)
NRBC BLD AUTO-RTO: 0 /100 WBC (ref 0–0.2)
PLATELET # BLD AUTO: 295 10*3/MM3 (ref 140–450)
POTASSIUM SERPL-SCNC: 4.4 MMOL/L (ref 3.5–5.2)
PROT SERPL-MCNC: 7.2 G/DL (ref 6–8.5)
RBC # BLD AUTO: 5.45 10*6/MM3 (ref 4.14–5.8)
SODIUM SERPL-SCNC: 143 MMOL/L (ref 136–145)
TRIGL SERPL-MCNC: 47 MG/DL (ref 0–150)
VLDLC SERPL CALC-MCNC: 10 MG/DL (ref 5–40)
WBC # BLD AUTO: 6.57 10*3/MM3 (ref 3.4–10.8)

## 2023-02-24 PROCEDURE — 99214 OFFICE O/P EST MOD 30 MIN: CPT | Performed by: FAMILY MEDICINE

## 2023-02-24 RX ORDER — OMEPRAZOLE 40 MG/1
40 CAPSULE, DELAYED RELEASE ORAL DAILY
Qty: 90 CAPSULE | Refills: 3 | Status: SHIPPED | OUTPATIENT
Start: 2023-02-24

## 2023-02-24 NOTE — ASSESSMENT & PLAN NOTE
Status unknown. We will obtain updated A1c. He will continue Januvia and Jardiance. We will monitor A1c and microalbumin every few months. He is on a statin and ARB.

## 2023-02-24 NOTE — PROGRESS NOTES
"Ulices Negron is a 52 y.o. male.    Chief Complaint   Patient presents with   • Hypertension   • Diabetes   • Hyperlipidemia       HPI     Ulices Negron is a 52-year-old male who presents today to follow-up on hypertension, diabetes, and hyperlipidemia.    Patient has had diabetes for the past few years. He has not been compliant with the medications and denies any side effects from it. He notes that he has been out of the Januvia and the Jardiance for 2 days. He has not been monitoring finger sticks. He states that he \"just looks for highs and lows himself.\" He denies hypoglycemic symptoms. He has been following a diabetic diet and has been active.     Patient has had hyperlipidemia for few years. He has not been compliant with low fat diet. He states he eats a moderate amount of red meat. He has been compliant with taking pravastatin, without side effects. His weight is down 11 pounds compared to last visit in 06/2022. According to his scale at home he [id number] pounds. He is active, and frequently exercises. His last laboratory studies noted an elevated LDL.     Patient has hypertension. He is taking losartan. He has been compliant with medications. The patient denies any side effects to the medication. Blood pressure is controlled in the office today. He is following a low salt diet. He is active.    In regards to anxiety he reports the BuSpar is somewhat effective. He states that he takes it in the morning to \"knock the edge off.\" He rarely takes more that 1 BuSpar per day. He notes that his stress level is high.     The patient reports he experiences reflux at night when he lays down that causes him to cough repeatedly. He notes if he eats late in the evening it exacerbates his symptoms. He denies his diet correlating with worsening symptoms. He has taken omeprazole in the past. He reports diarrhea that he contributes to his diet.     He received 2 COVID-19 vaccines that he reports a reaction to. He " states that since he received his first COVID-19 vaccine 2 to 3 years ago, he has been experiencing rhinorrhea that is clear. Secondary to the ongoing rhinorrhea he reports that his nostrils are irritated. He admits to taking Zyrtec daily. He does not recall if he has received the tetanus in the last 10 years. He is not up-to-date on a pneumonia vaccine. He seen Dr. zayas in 12/2022 and was treated for shingles.    The following portions of the patient's history were reviewed and updated as appropriate: allergies, current medications, past family history, past medical history, past social history, past surgical history and problem list.     No Known Allergies      Current Outpatient Medications:   •  Blood Glucose Monitoring Suppl (ONE TOUCH ULTRA 2) w/Device kit, USE DAILY, Disp: 1 each, Rfl: 0  •  busPIRone (BUSPAR) 5 MG tablet, TAKE ONE TABLET BY MOUTH TWICE A DAY AS NEEDED FOR ANXIETY, Disp: 40 tablet, Rfl: 0  •  cetirizine (zyrTEC) 10 MG tablet, Take 10 mg by mouth Daily., Disp: , Rfl:   •  empagliflozin (Jardiance) 25 MG tablet tablet, Take 1 tablet by mouth Daily. Needs an appointment for further refills, Disp: 90 tablet, Rfl: 3  •  glucose blood test strip, Use as instructed; E.11.9, Disp: 100 each, Rfl: 5  •  losartan (COZAAR) 50 MG tablet, Take 1 tablet by mouth Daily., Disp: 90 tablet, Rfl: 0  •  ONE TOUCH CLUB LANCETS misc, 1 each Daily. E11.9, Disp: 100 each, Rfl: 5  •  pravastatin (PRAVACHOL) 80 MG tablet, Take 1 tablet by mouth Every Night., Disp: 90 tablet, Rfl: 0  •  SITagliptin (Januvia) 100 MG tablet, Take 1 tablet by mouth Daily., Disp: 90 tablet, Rfl: 3  •  ipratropium (ATROVENT) 0.03 % nasal spray, 2 sprays into the nostril(s) as directed by provider Every 12 (Twelve) Hours., Disp: 1 each, Rfl: 0  •  mesalamine (ROWASA) 4 g enema, INSERT 1 ENEMA INTO THE RECTUM EVERY NIGHT, Disp: 30 enema, Rfl: 0  •  omeprazole (priLOSEC) 40 MG capsule, Take 1 capsule by mouth Daily., Disp: 90 capsule, Rfl: 3  •   "promethazine-dextromethorphan (PROMETHAZINE-DM) 6.25-15 MG/5ML syrup, Take 1 to 2 teaspoons twice daily as needed for cough, Disp: 118 mL, Rfl: 0  •  triamcinolone (KENALOG) 0.1 % cream, Apply 1 application topically to the appropriate area as directed 2 (Two) Times a Day., Disp: 45 g, Rfl: 1  •  valACYclovir (VALTREX) 1000 MG tablet, , Disp: , Rfl:     ROS    Review of Systems   Constitutional: Negative for fatigue.   HENT: Positive for rhinorrhea.    Cardiovascular: Negative for chest pain and leg swelling.   Gastrointestinal: Positive for diarrhea and GERD. Negative for nausea and vomiting.   Skin: Negative for color change and rash.   Psychiatric/Behavioral: Positive for stress. Negative for depressed mood. The patient is not nervous/anxious.        Vitals:    02/24/23 0807   BP: 128/80   Pulse: 70   Resp: 15   Temp: 97.3 °F (36.3 °C)   SpO2: 99%   Weight: 76.7 kg (169 lb)   Height: 167.6 cm (65.98\")   PainSc: 0-No pain     Body mass index is 27.29 kg/m².    Physical Exam     Physical Exam  Constitutional:       General: He is not in acute distress.     Appearance: Normal appearance. He is well-developed.   HENT:      Head: Normocephalic and atraumatic.      Right Ear: External ear normal.      Left Ear: External ear normal.   Eyes:      Extraocular Movements: Extraocular movements intact.      Conjunctiva/sclera: Conjunctivae normal.   Cardiovascular:      Rate and Rhythm: Normal rate and regular rhythm.      Heart sounds: No murmur heard.  Pulmonary:      Effort: Pulmonary effort is normal. No respiratory distress.      Breath sounds: Normal breath sounds. No wheezing.   Abdominal:      General: Bowel sounds are normal. There is no distension.      Palpations: Abdomen is soft.      Tenderness: There is no abdominal tenderness.   Musculoskeletal:      Right lower leg: No edema.      Left lower leg: No edema.   Skin:     General: Skin is warm and dry.   Neurological:      Mental Status: He is alert and oriented " to person, place, and time.      Cranial Nerves: No cranial nerve deficit.   Psychiatric:         Mood and Affect: Mood normal.         Behavior: Behavior normal.         Assessment/Plan    Diagnoses and all orders for this visit:    1. Type 2 diabetes mellitus without complication, without long-term current use of insulin (HCC) (Primary)  Assessment & Plan:  Status unknown. We will obtain updated A1c. He will continue Januvia and Jardiance. We will monitor A1c and microalbumin every few months. He is on a statin and ARB.    Orders:  -     SITagliptin (Januvia) 100 MG tablet; Take 1 tablet by mouth Daily.  Dispense: 90 tablet; Refill: 3  -     Hemoglobin A1c    2. Hyperlipidemia, unspecified hyperlipidemia type  Assessment & Plan:  Uncontrolled on last labs. Patient will continue pravastatin. We will obtain updated lipid panel today.    Orders:  -     Lipid Panel    3. Essential hypertension  Assessment & Plan:  Controlled on current medication. He will continue losartan.    Orders:  -     CBC & Differential  -     Comprehensive Metabolic Panel    4. Anxiety    5. Gastroesophageal reflux disease without esophagitis  Assessment & Plan:  Uncontrolled. We will restart omeprazole.      Other orders  -     empagliflozin (Jardiance) 25 MG tablet tablet; Take 1 tablet by mouth Daily. Needs an appointment for further refills  Dispense: 90 tablet; Refill: 3  -     omeprazole (priLOSEC) 40 MG capsule; Take 1 capsule by mouth Daily.  Dispense: 90 capsule; Refill: 3      New Medications Ordered This Visit   Medications   • SITagliptin (Januvia) 100 MG tablet     Sig: Take 1 tablet by mouth Daily.     Dispense:  90 tablet     Refill:  3     NEEDS AN APPOINTMENT FOR FURTHER REFILLS   • empagliflozin (Jardiance) 25 MG tablet tablet     Sig: Take 1 tablet by mouth Daily. Needs an appointment for further refills     Dispense:  90 tablet     Refill:  3   • omeprazole (priLOSEC) 40 MG capsule     Sig: Take 1 capsule by mouth Daily.      Dispense:  90 capsule     Refill:  3       No orders of the defined types were placed in this encounter.      Return in about 3 months (around 5/24/2023) for diabetes, HTN, HPL, GERD.    Carmen Benitez DO     Transcribed from ambient dictation for Carmen Benitez DO by Salma Yoo.  02/24/23   10:31 EST    Patient or patient representative verbalized consent to the visit recording.  I have personally performed the services described in this document as transcribed by the above individual, and it is both accurate and complete.  Carmen Benitez DO  2/24/2023  18:18 EST

## 2023-02-24 NOTE — ASSESSMENT & PLAN NOTE
Uncontrolled on last labs. Patient will continue pravastatin. We will obtain updated lipid panel today.

## 2023-05-30 RX ORDER — BUSPIRONE HYDROCHLORIDE 5 MG/1
TABLET ORAL
Qty: 60 TABLET | Refills: 1 | Status: SHIPPED | OUTPATIENT
Start: 2023-05-30

## 2023-05-30 NOTE — TELEPHONE ENCOUNTER
Rx Refill Note  Requested Prescriptions     Pending Prescriptions Disp Refills    busPIRone (BUSPAR) 5 MG tablet [Pharmacy Med Name: busPIRone HCL 5 MG TABLET] 40 tablet 1     Sig: TAKE ONE TABLET BY MOUTH TWICE A DAY AS NEEDED FOR ANXIETY      Last office visit with prescribing clinician: 2/24/2023   Last telemedicine visit with prescribing clinician: Visit date not found   Next office visit with prescribing clinician: Visit date not found                         Would you like a call back once the refill request has been completed: [] Yes [] No    If the office needs to give you a call back, can they leave a voicemail: [] Yes [] No    Alexandra Norris MA  05/30/23, 14:28 EDT

## 2023-09-06 ENCOUNTER — OFFICE VISIT (OUTPATIENT)
Dept: INTERNAL MEDICINE | Facility: CLINIC | Age: 53
End: 2023-09-06
Payer: COMMERCIAL

## 2023-09-06 VITALS
DIASTOLIC BLOOD PRESSURE: 80 MMHG | BODY MASS INDEX: 27 KG/M2 | HEART RATE: 78 BPM | WEIGHT: 168 LBS | OXYGEN SATURATION: 97 % | TEMPERATURE: 98 F | SYSTOLIC BLOOD PRESSURE: 120 MMHG | HEIGHT: 66 IN

## 2023-09-06 DIAGNOSIS — R09.81 COMPLAINT OF NASAL CONGESTION: ICD-10-CM

## 2023-09-06 DIAGNOSIS — E11.9 TYPE 2 DIABETES MELLITUS WITHOUT COMPLICATION, WITHOUT LONG-TERM CURRENT USE OF INSULIN: ICD-10-CM

## 2023-09-06 DIAGNOSIS — J01.90 ACUTE NON-RECURRENT SINUSITIS, UNSPECIFIED LOCATION: Primary | ICD-10-CM

## 2023-09-06 LAB
EXPIRATION DATE: NORMAL
FLUAV AG UPPER RESP QL IA.RAPID: NOT DETECTED
FLUBV AG UPPER RESP QL IA.RAPID: NOT DETECTED
HBA1C MFR BLD: 6.3 %
INTERNAL CONTROL: NORMAL
Lab: NORMAL
POC CREATININE URINE: 50
POC MICROALBUMIN URINE: 30
SARS-COV-2 AG UPPER RESP QL IA.RAPID: NOT DETECTED

## 2023-09-06 RX ORDER — AMOXICILLIN AND CLAVULANATE POTASSIUM 875; 125 MG/1; MG/1
1 TABLET, FILM COATED ORAL 2 TIMES DAILY
Qty: 20 TABLET | Refills: 0 | Status: SHIPPED | OUTPATIENT
Start: 2023-09-06

## 2023-09-06 RX ORDER — PRAVASTATIN SODIUM 80 MG/1
80 TABLET ORAL NIGHTLY
Qty: 90 TABLET | Refills: 3 | Status: SHIPPED | OUTPATIENT
Start: 2023-09-06

## 2023-09-06 RX ORDER — LOSARTAN POTASSIUM 50 MG/1
50 TABLET ORAL DAILY
Qty: 90 TABLET | Refills: 3 | Status: SHIPPED | OUTPATIENT
Start: 2023-09-06

## 2023-09-06 RX ORDER — BUSPIRONE HYDROCHLORIDE 5 MG/1
5 TABLET ORAL 2 TIMES DAILY PRN
Qty: 180 TABLET | Refills: 3 | Status: SHIPPED | OUTPATIENT
Start: 2023-09-06

## 2023-09-06 NOTE — PROGRESS NOTES
Ulices Negron is a 53 y.o. male.    Chief Complaint   Patient presents with    Nasal Congestion     Since Thursday of last week. Itchy watery eyes. Nasal drainage.       Facial Pain     Sinus pain, isn't able to blow out much because of a swollen cavity.        HPI     Ulices Negron is a 53-year-old male with date of birth 1970. He presents today complaining of upper respiratory symptoms. He is also following up on diabetes.     Patient reports they have not been feeling well for the last week. He admits to nasal congestion, eye irritation, postnasal drip and swelling in the septum. He denies rhinorrhea, facial pain, ear pain, sore throat, headache, sneezing, fever, and dyspnea. They have tried antihistamines, Sudafed and Sinex for this issue without good response. The patient usually has seasonal allergies and does take an antihistamine daily. He began having itchiness in the eyes and some drainage until 09/02/2023, and then he began feeling swelling in the septum.      Patient has had diabetes for the past few years. He has been compliant with the medications and denies any side effects from it. He has not been monitoring finger sticks. He has not had hypoglycemic symptoms. He has been following a diabetic diet and has been active. The patient does not eat carbs, but he has gained weight. He has increased his eating because of his performance at work.     The following portions of the patient's history were reviewed and updated as appropriate: allergies, current medications, past family history, past medical history, past social history, past surgical history and problem list.     No Known Allergies      Current Outpatient Medications:     Blood Glucose Monitoring Suppl (ONE TOUCH ULTRA 2) w/Device kit, USE DAILY, Disp: 1 each, Rfl: 0    busPIRone (BUSPAR) 5 MG tablet, Take 1 tablet by mouth 2 (Two) Times a Day As Needed (anxiety)., Disp: 180 tablet, Rfl: 3    cetirizine (zyrTEC) 10 MG tablet, Take  1 tablet by mouth Daily., Disp: , Rfl:     empagliflozin (Jardiance) 25 MG tablet tablet, Take 1 tablet by mouth Daily. Needs an appointment for further refills, Disp: 90 tablet, Rfl: 3    glucose blood test strip, Use as instructed; E.11.9, Disp: 100 each, Rfl: 5    ipratropium (ATROVENT) 0.03 % nasal spray, 2 sprays into the nostril(s) as directed by provider Every 12 (Twelve) Hours., Disp: 1 each, Rfl: 0    losartan (COZAAR) 50 MG tablet, Take 1 tablet by mouth Daily., Disp: 90 tablet, Rfl: 3    mesalamine (ROWASA) 4 g enema, INSERT 1 ENEMA INTO THE RECTUM EVERY NIGHT, Disp: 30 enema, Rfl: 0    omeprazole (priLOSEC) 40 MG capsule, Take 1 capsule by mouth Daily., Disp: 90 capsule, Rfl: 3    ONE TOUCH CLUB LANCETS misc, 1 each Daily. E11.9, Disp: 100 each, Rfl: 5    pravastatin (PRAVACHOL) 80 MG tablet, Take 1 tablet by mouth Every Night., Disp: 90 tablet, Rfl: 3    promethazine-dextromethorphan (PROMETHAZINE-DM) 6.25-15 MG/5ML syrup, Take 1 to 2 teaspoons twice daily as needed for cough, Disp: 118 mL, Rfl: 0    SITagliptin (Januvia) 100 MG tablet, Take 1 tablet by mouth Daily., Disp: 90 tablet, Rfl: 3    triamcinolone (KENALOG) 0.1 % cream, Apply 1 application topically to the appropriate area as directed 2 (Two) Times a Day., Disp: 45 g, Rfl: 1    valACYclovir (VALTREX) 1000 MG tablet, , Disp: , Rfl:     amoxicillin-clavulanate (AUGMENTIN) 875-125 MG per tablet, Take 1 tablet by mouth 2 (Two) Times a Day., Disp: 20 tablet, Rfl: 0    ROS    Review of Systems   Constitutional:  Negative for chills, fatigue and fever.   HENT:  Positive for congestion and postnasal drip. Negative for sore throat.    Respiratory:  Negative for cough, shortness of breath and wheezing.    Cardiovascular:  Negative for chest pain and leg swelling.   Gastrointestinal:  Negative for abdominal pain, constipation, diarrhea, nausea and vomiting.   Allergic/Immunologic: Positive for environmental allergies.     Vitals:    09/06/23 0821   BP:  "120/80   BP Location: Right arm   Patient Position: Sitting   Cuff Size: Adult   Pulse: 78   Temp: 98 °F (36.7 °C)   SpO2: 97%   Weight: 76.2 kg (168 lb)   Height: 167.6 cm (65.98\")     Body mass index is 27.13 kg/m².    Physical Exam     Physical Exam  Constitutional:       General: He is not in acute distress.     Appearance: Normal appearance. He is well-developed.   HENT:      Head: Normocephalic and atraumatic.      Right Ear: External ear normal.      Left Ear: External ear normal.      Ears:      Comments: Has cloudy tympanic membrane bilaterally.      Mouth/Throat:      Comments: Significant oropharyngeal erythema with postnasal drip.  Eyes:      Extraocular Movements: Extraocular movements intact.      Conjunctiva/sclera: Conjunctivae normal.   Cardiovascular:      Rate and Rhythm: Normal rate and regular rhythm.      Heart sounds: No murmur heard.  Pulmonary:      Effort: Pulmonary effort is normal. No respiratory distress.      Breath sounds: Normal breath sounds. No wheezing.   Abdominal:      General: Bowel sounds are normal. There is no distension.      Palpations: Abdomen is soft.      Tenderness: There is no abdominal tenderness.   Musculoskeletal:      Cervical back: Neck supple.      Right lower leg: No edema.      Left lower leg: No edema.   Lymphadenopathy:      Cervical: No cervical adenopathy.   Skin:     General: Skin is warm and dry.   Neurological:      Mental Status: He is alert and oriented to person, place, and time.      Cranial Nerves: No cranial nerve deficit.   Psychiatric:         Mood and Affect: Mood normal.         Behavior: Behavior normal.       Assessment/Plan    Diagnoses and all orders for this visit:    1. Acute non-recurrent sinusitis, unspecified location (Primary)  Assessment & Plan:  We will treat with Augmentin. Encouraged to use Afrin for 3 days only. His COVID-19 and influenza tests are negative.      2. Type 2 diabetes mellitus without complication, without long-term " current use of insulin  Assessment & Plan:  Controlled with A1C of 6.2%. He will continue current oral medications including Jardiance and Januvia. We will monitor A1C and microalbumin every few months.     Orders:  -     POC Glycosylated Hemoglobin (Hb A1C)  -     POCT microalbumin    3. Complaint of nasal congestion  -     POCT SARS-CoV-2 Antigen JOAN + Flu    Other orders  -     empagliflozin (Jardiance) 25 MG tablet tablet; Take 1 tablet by mouth Daily. Needs an appointment for further refills  Dispense: 90 tablet; Refill: 3  -     SITagliptin (Januvia) 100 MG tablet; Take 1 tablet by mouth Daily.  Dispense: 90 tablet; Refill: 3  -     amoxicillin-clavulanate (AUGMENTIN) 875-125 MG per tablet; Take 1 tablet by mouth 2 (Two) Times a Day.  Dispense: 20 tablet; Refill: 0  -     losartan (COZAAR) 50 MG tablet; Take 1 tablet by mouth Daily.  Dispense: 90 tablet; Refill: 3  -     pravastatin (PRAVACHOL) 80 MG tablet; Take 1 tablet by mouth Every Night.  Dispense: 90 tablet; Refill: 3  -     busPIRone (BUSPAR) 5 MG tablet; Take 1 tablet by mouth 2 (Two) Times a Day As Needed (anxiety).  Dispense: 180 tablet; Refill: 3        New Medications Ordered This Visit   Medications    empagliflozin (Jardiance) 25 MG tablet tablet     Sig: Take 1 tablet by mouth Daily. Needs an appointment for further refills     Dispense:  90 tablet     Refill:  3    SITagliptin (Januvia) 100 MG tablet     Sig: Take 1 tablet by mouth Daily.     Dispense:  90 tablet     Refill:  3     NEEDS AN APPOINTMENT FOR FURTHER REFILLS    amoxicillin-clavulanate (AUGMENTIN) 875-125 MG per tablet     Sig: Take 1 tablet by mouth 2 (Two) Times a Day.     Dispense:  20 tablet     Refill:  0    losartan (COZAAR) 50 MG tablet     Sig: Take 1 tablet by mouth Daily.     Dispense:  90 tablet     Refill:  3    pravastatin (PRAVACHOL) 80 MG tablet     Sig: Take 1 tablet by mouth Every Night.     Dispense:  90 tablet     Refill:  3    busPIRone (BUSPAR) 5 MG tablet      Sig: Take 1 tablet by mouth 2 (Two) Times a Day As Needed (anxiety).     Dispense:  180 tablet     Refill:  3       No orders of the defined types were placed in this encounter.      Return in about 6 months (around 3/6/2024) for HTN, Diabetes, HPL, Annual.    Carmen Benitez DO    Transcribed from ambient dictation for Carmen Benitez DO by Mimi Calles.  09/06/23   10:31 EDT    Patient or patient representative verbalized consent to the visit recording.  I have personally performed the services described in this document as transcribed by the above individual, and it is both accurate and complete.  Carmen Benitez DO  9/6/2023  13:26 EDT

## 2023-09-06 NOTE — ASSESSMENT & PLAN NOTE
Controlled with A1C of 6.2%. He will continue current oral medications including Jardiance and Januvia. We will monitor A1C and microalbumin every few months.

## 2023-09-06 NOTE — ASSESSMENT & PLAN NOTE
We will treat with Augmentin. Encouraged to use Afrin for 3 days only. His COVID-19 and influenza tests are negative.

## 2023-09-15 ENCOUNTER — TELEPHONE (OUTPATIENT)
Dept: INTERNAL MEDICINE | Facility: CLINIC | Age: 53
End: 2023-09-15
Payer: COMMERCIAL

## 2023-09-15 RX ORDER — METHYLPREDNISOLONE 4 MG/1
TABLET ORAL
Qty: 21 EACH | Refills: 0 | Status: SHIPPED | OUTPATIENT
Start: 2023-09-15

## 2023-09-15 RX ORDER — CEFDINIR 300 MG/1
300 CAPSULE ORAL 2 TIMES DAILY
Qty: 20 CAPSULE | Refills: 0 | Status: SHIPPED | OUTPATIENT
Start: 2023-09-15

## 2023-09-15 NOTE — TELEPHONE ENCOUNTER
Caller: Ulices Negron    Relationship: Self    Best call back number: [phone]    What medication are you requesting:     What are your current symptoms: HEAD CONGESTION, SINUS PRESSURE    How long have you been experiencing symptoms: 15 DAYS    Have you had these symptoms before:    [x] Yes  [] No    Have you been treated for these symptoms before:   [x] Yes  [] No    If a prescription is needed, what is your preferred pharmacy and phone number:  [facility] [id number] - [place] [address] AT [facility] - [phone]  - [phone] FX  P: [phone]  F: [phone]    Additional notes: PATIENT STATES THAT THE ANTIBIOTIC DID NOT HELP THE SINS PRESSURE. PATIENT IS REQUESTING A MEDICATION TO BE CALLED IN TODAY

## 2023-09-15 NOTE — TELEPHONE ENCOUNTER
Cefdinir and medrol dosepack sent to the pharmacy. Please notify the patient the steroid pack will elevate his glucose readings.

## 2024-02-19 RX ORDER — OMEPRAZOLE 40 MG/1
40 CAPSULE, DELAYED RELEASE ORAL DAILY
Qty: 30 CAPSULE | Refills: 0 | Status: SHIPPED | OUTPATIENT
Start: 2024-02-19

## 2024-03-04 NOTE — TELEPHONE ENCOUNTER
Caller: Ulices Negron    Relationship: Self    Best call back number: [phone]     Requested Prescriptions:   Requested Prescriptions     Pending Prescriptions Disp Refills    empagliflozin (Jardiance) 25 MG tablet tablet 90 tablet 3     Sig: Take 1 tablet by mouth Daily. Needs an appointment for further refills        Pharmacy where request should be sent: [facility] [id number] [address] AT [facility] [phone] [facility] [phone]      Last office visit with prescribing clinician: 9/6/2023   Last telemedicine visit with prescribing clinician: Visit date not found   Next office visit with prescribing clinician: 3/19/2024     Additional details provided by patient:   PATIENT STATED THAT THIS MEDICATION HAS TO BE A 30 DAY SUPPLIES ONLY DUE TO INSURANCE WILL NOT COVER ANYMORE THAN 30 DAY      Does the patient have less than a 3 day supply:  [x] Yes  [] No    Would you like a call back once the refill request has been completed: [] Yes [x] No    If the office needs to give you a call back, can they leave a voicemail: [] Yes [x] No    Austyn Davis Rep   03/04/24 11:07 EST

## 2024-03-19 ENCOUNTER — OFFICE VISIT (OUTPATIENT)
Dept: INTERNAL MEDICINE | Facility: CLINIC | Age: 54
End: 2024-03-19
Payer: COMMERCIAL

## 2024-03-19 VITALS
HEIGHT: 66 IN | OXYGEN SATURATION: 100 % | HEART RATE: 78 BPM | SYSTOLIC BLOOD PRESSURE: 112 MMHG | DIASTOLIC BLOOD PRESSURE: 70 MMHG | TEMPERATURE: 98 F | BODY MASS INDEX: 27.16 KG/M2 | WEIGHT: 169 LBS

## 2024-03-19 DIAGNOSIS — Z00.00 WELL ADULT EXAM: Primary | ICD-10-CM

## 2024-03-19 DIAGNOSIS — E78.5 HYPERLIPIDEMIA, UNSPECIFIED HYPERLIPIDEMIA TYPE: ICD-10-CM

## 2024-03-19 DIAGNOSIS — Z23 NEED FOR DIPHTHERIA-TETANUS-PERTUSSIS (TDAP) VACCINE: ICD-10-CM

## 2024-03-19 DIAGNOSIS — E11.9 TYPE 2 DIABETES MELLITUS WITHOUT COMPLICATION, WITHOUT LONG-TERM CURRENT USE OF INSULIN: ICD-10-CM

## 2024-03-19 DIAGNOSIS — I10 ESSENTIAL HYPERTENSION: ICD-10-CM

## 2024-03-19 RX ORDER — OMEPRAZOLE 40 MG/1
40 CAPSULE, DELAYED RELEASE ORAL DAILY
Qty: 90 CAPSULE | Refills: 1 | Status: SHIPPED | OUTPATIENT
Start: 2024-03-19

## 2024-03-19 NOTE — PROGRESS NOTES
lUices Negron is a 53 y.o. male.    Chief Complaint   Patient presents with    Annual Exam       HPI     Ulices Negron is a 53-year-old male who presents today for an annual physical exam. He is also following up on hypertension, diabetes, and hyperlipidemia.     Patient has hypertension. He is taking his medications and is complaint with taking them. The patient denies any side effects to the medication. His blood pressure is controlled in the office today.      Patient has had diabetes for the past few years. He is compliant with his medications and denies any side effects from it. He has not been monitoring finger sticks. However, he does have the supplies to monitor his blood glucose levels. He states when he used to monitor his blood glucose levels his fingerstick range was between 98 to 120 mg/dL. Occasionally, he will get above 140 mg/dL. He has not had hypoglycemic symptoms. He has been eating more carbohydrates and sugars recently. He does not eat candy anymore. He works frequently; however, he is active. He is up to date on his eye exam, and dental exams.       Patient has had hyperlipidemia for few years. He is compliant with taking his medications at night, without side effects. His weight is stable compared to last visit.        He is currently fasting. He is up to date on his colonoscopy; However, he is uncertain if he was advised to return in 10 years. He states he has internal hemorrhoids, which were found during his last colonoscopy. He was prescribed mesalamine enema to use whenever he sees blood in stool until it clears up. He last saw Dr. Sepulveda in 07/2020. He has a history of ulcerative colitis.      He has red spots on his legs all the time. He does have allergies. His legs itch. However, he thinks it could be dry. He uses Curel most of the time. He takes buspirone twice a day, once in the morning, once before he goes to work, and once after he leaves from work. He inquiries about  swollen lymph nodes in his neck.     He feels short of breath only when he goes up the stairs. He states he has had a runny nose ever since he received the COVID-19 vaccine.     He received his influenza vaccine in the fall. He declines the pneumonia vaccine today. He is interested in the tetanus vaccine today.     The following portions of the patient's history were reviewed and updated as appropriate: allergies, current medications, past family history, past medical history, past social history, past surgical history and problem list.     Past Medical History:   Diagnosis Date    Allergic     Seasonal    Anxiety     Bisiprone    Diabetes mellitus     Elevated cholesterol     Hyperlipidemia     Hypertension        Past Surgical History:   Procedure Laterality Date    BACK SURGERY      CHOLECYSTECTOMY      COLONOSCOPY      2017-Dr Rosales    COLONOSCOPY N/A 2020    Procedure: COLONOSCOPY;  Surgeon: Karina Castillo MD;  Location: [facility] ENDOSCOPY;  Service: Gastroenterology;  Laterality: N/A;    HERNIA REPAIR      VASECTOMY         Family History   Problem Relation Age of Onset    Heart attack Maternal Aunt     Heart disease Maternal Grandmother     Heart disease Paternal Grandfather        Social History     Socioeconomic History    Marital status:    Tobacco Use    Smoking status: Former     Current packs/day: 0.00     Average packs/day: 0.3 packs/day for 20.0 years (5.0 ttl pk-yrs)     Types: Cigarettes     Start date: 1987     Quit date: 2007     Years since quittin.2    Smokeless tobacco: Never   Vaping Use    Vaping status: Never Used   Substance and Sexual Activity    Alcohol use: Yes     Comment: rarely    Drug use: Never    Sexual activity: Yes     Partners: Female     Birth control/protection: None       No Known Allergies      Current Outpatient Medications:     Blood Glucose Monitoring Suppl (ONE TOUCH ULTRA 2) w/Device kit, USE DAILY, Disp: 1 each, Rfl: 0    busPIRone  (BUSPAR) 5 MG tablet, Take 1 tablet by mouth 2 (Two) Times a Day As Needed (anxiety)., Disp: 180 tablet, Rfl: 3    cetirizine (zyrTEC) 10 MG tablet, Take 1 tablet by mouth Daily., Disp: , Rfl:     glucose blood test strip, Use as instructed; E.11.9, Disp: 100 each, Rfl: 5    ipratropium (ATROVENT) 0.03 % nasal spray, 2 sprays into the nostril(s) as directed by provider Every 12 (Twelve) Hours., Disp: 1 each, Rfl: 0    losartan (COZAAR) 50 MG tablet, Take 1 tablet by mouth Daily., Disp: 90 tablet, Rfl: 3    mesalamine (ROWASA) 4 g enema, INSERT 1 ENEMA INTO THE RECTUM EVERY NIGHT, Disp: 30 enema, Rfl: 0    ONE TOUCH CLUB LANCETS misc, 1 each Daily. E11.9, Disp: 100 each, Rfl: 5    pravastatin (PRAVACHOL) 80 MG tablet, Take 1 tablet by mouth Every Night., Disp: 90 tablet, Rfl: 3    SITagliptin (Januvia) 100 MG tablet, Take 1 tablet by mouth Daily., Disp: 90 tablet, Rfl: 3    triamcinolone (KENALOG) 0.1 % cream, Apply 1 application topically to the appropriate area as directed 2 (Two) Times a Day., Disp: 45 g, Rfl: 1    valACYclovir (VALTREX) 1000 MG tablet, , Disp: , Rfl:     empagliflozin (Jardiance) 25 MG tablet tablet, Take 1 tablet by mouth Daily. Needs an appointment for further refills, Disp: 90 tablet, Rfl: 3    omeprazole (priLOSEC) 40 MG capsule, TAKE 1 CAPSULE BY MOUTH DAILY, Disp: 90 capsule, Rfl: 1    ROS    Review of Systems   Constitutional:  Negative for fatigue.   HENT:  Negative for congestion, postnasal drip and sore throat.    Respiratory:  Negative for cough, shortness of breath and wheezing.    Cardiovascular:  Negative for chest pain and leg swelling.   Gastrointestinal:  Negative for abdominal pain, constipation, diarrhea, nausea and vomiting.   Genitourinary:  Negative for dysuria and frequency.   Skin:  Positive for rash.   Neurological:  Negative for headache.   Hematological:  Does not bruise/bleed easily.   Psychiatric/Behavioral:  Negative for depressed mood. The patient is not  "nervous/anxious.        Vitals:    03/19/24 0808   BP: 112/70   BP Location: Right arm   Patient Position: Sitting   Cuff Size: Adult   Pulse: 78   Temp: 98 °F (36.7 °C)   SpO2: 100%   Weight: 76.7 kg (169 lb)   Height: 167.6 cm (65.98\")   PainSc: 0-No pain     Body mass index is 27.29 kg/m².    Physical Exam     Physical Exam  Constitutional:       General: He is not in acute distress.     Appearance: He is well-developed.   HENT:      Head: Normocephalic and atraumatic.      Right Ear: External ear normal.      Left Ear: External ear normal.   Eyes:      Conjunctiva/sclera: Conjunctivae normal.      Pupils: Pupils are equal, round, and reactive to light.   Cardiovascular:      Rate and Rhythm: Normal rate and regular rhythm.      Heart sounds: No murmur heard.  Pulmonary:      Effort: Pulmonary effort is normal. No respiratory distress.      Breath sounds: Normal breath sounds. No wheezing.   Abdominal:      General: Bowel sounds are normal. There is no distension.      Palpations: Abdomen is soft.      Tenderness: There is no abdominal tenderness.   Musculoskeletal:         General: Normal range of motion.      Cervical back: Normal range of motion and neck supple.   Lymphadenopathy:      Cervical: No cervical adenopathy.   Skin:     General: Skin is warm and dry.      Comments: Rash noted to his lower extremities.   Neurological:      Mental Status: He is alert and oriented to person, place, and time.      Cranial Nerves: No cranial nerve deficit.   Psychiatric:         Behavior: Behavior normal.         Assessment/Plan    Diagnoses and all orders for this visit:    1. Well adult exam (Primary)  Assessment & Plan:  - I discussed with the patient routine health maintenance including vaccines, dental/eye health, healthy diet and exercise, colorectal cancer screening. Mental health has been addressed today as well. Routine labs have been ordered.      2. Type 2 diabetes mellitus without complication, without " long-term current use of insulin  Assessment & Plan:  - It is controlled per last A1c. I will obtain updated A1c and adjust medications if necessary. I will monitor microalbumin every few months. He will continue Januvia and Jardiance at this time. He is on a statin and an ARB.    Orders:  -     Hemoglobin A1c  -     Microalbumin / Creatinine Urine Ratio - Urine, Clean Catch    3. Hyperlipidemia, unspecified hyperlipidemia type  Assessment & Plan:   - It is stable per last labs. I will obtain an updated lipid panel. He will continue pravastatin.    Orders:  -     Lipid Panel    4. Essential hypertension  Assessment & Plan:  - Controlled on current medication. He will continue losartan.    Orders:  -     CBC & Differential  -     Comprehensive Metabolic Panel    5. Need for diphtheria-tetanus-pertussis (Tdap) vaccine  -     Tdap Vaccine Greater Than or Equal To 6yo IM        No orders of the defined types were placed in this encounter.      Orders Placed This Encounter   Procedures    Tdap Vaccine Greater Than or Equal To 6yo IM       Return in about 3 months (around 6/19/2024) for HTN, Diabetes, anxiety.  The patient will follow up in 3 months.    Carmen Benitez DO    Transcribed from ambient dictation for Carmen Benitez DO by Geraldine Yoder.  03/19/24   10:24 EDT    Patient or patient representative verbalized consent to the visit recording.  I have personally performed the services described in this document as transcribed by the above individual, and it is both accurate and complete.  Carmen Benitez DO  3/19/2024  17:49 EDT

## 2024-03-19 NOTE — TELEPHONE ENCOUNTER
Rx Refill Note  Requested Prescriptions     Pending Prescriptions Disp Refills    omeprazole (priLOSEC) 40 MG capsule [Pharmacy Med Name: OMEPRAZOLE DR 40 MG CAPSULE] 90 capsule 1     Sig: TAKE 1 CAPSULE BY MOUTH DAILY      Last office visit with prescribing clinician: 9/6/2023   Last telemedicine visit with prescribing clinician: Visit date not found   Next office visit with prescribing clinician: 3/19/2024                         Naheed Montero MA  03/19/24, 10:04 EDT

## 2024-03-19 NOTE — ASSESSMENT & PLAN NOTE
- I discussed with the patient routine health maintenance including vaccines, dental/eye health, healthy diet and exercise, colorectal cancer screening. Mental health has been addressed today as well. Routine labs have been ordered.

## 2024-03-19 NOTE — ASSESSMENT & PLAN NOTE
- It is controlled per last A1c. I will obtain updated A1c and adjust medications if necessary. I will monitor microalbumin every few months. He will continue Januvia and Jardiance at this time. He is on a statin and an ARB.

## 2024-03-19 NOTE — LETTER
[facility]  Vaccine Consent Form    Patient Name:  Ulices Negron  Patient :  1970     Vaccine(s) Ordered    Tdap Vaccine Greater Than or Equal To 6yo IM        Screening Checklist  The following questions should be completed prior to vaccination. If you answer “yes” to any question, it does not necessarily mean you should not be vaccinated. It just means we may need to clarify or ask more questions. If a question is unclear, please ask your healthcare provider to explain it.    Yes No   Any fever or moderate to severe illness today (mild illness and/or antibiotic treatment are not contraindications)?     Do you have a history of a serious reaction to any previous vaccinations, such as anaphylaxis, encephalopathy within 7 days, Guillain-Ashton syndrome within 6 weeks, seizure?     Have you received any live vaccine(s) (e.g MMR, SARA) or any other vaccines in the last month (to ensure duplicate doses aren't given)?     Do you have an anaphylactic allergy to latex (DTaP, DTaP-IPV, Hep A, Hep B, MenB, RV, Td, Tdap), baker’s yeast (Hep B, HPV), polysorbates (RSV, nirsevimab, PCV 20, Rotavirrus, Tdap, Shingrix), or gelatin (SARA, MMR)?     Do you have an anaphylactic allergy to neomycin (Rabies, SARA, MMR, IPV, Hep A), polymyxin B (IPV), or streptomycin (IPV)?      Any cancer, leukemia, AIDS, or other immune system disorder? (SARA, MMR, RV)     Do you have a parent, brother, or sister with an immune system problem (if immune competence of vaccine recipient clinically verified, can proceed)? (MMR, SARA)     Any recent steroid treatments for >2 weeks, chemotherapy, or radiation treatment? (SARA, MMR)     Have you received antibody-containing blood transfusions or IVIG in the past 11 months (recommended interval is dependent on product)? (MMR, SARA)     Have you taken antiviral drugs (acyclovir, famciclovir, valacyclovir for SARA) in the last 24 or 48 hours, respectively?      Are you pregnant or planning to become  "pregnant within 1 month? (SARA, MMR, HPV, IPV, MenB, Abrexvy; For Hep B- refer to Engerix-B; For RSV - Abrysvo is indicated for 32-36 weeks of pregnancy from September to January)     For infants, have you ever been told your child has had intussusception or a medical emergency involving obstruction of the intestine (Rotavirus)? If not for an infant, can skip this question.         *Ordering Physicians/APC should be consulted if \"yes\" is checked by the patient or guardian above.  I have received, read, and understand the Vaccine Information Statement (VIS) for each vaccine ordered.  I have considered my or my child's health status as well as the health status of my close contacts.  I have taken the opportunity to discuss my vaccine questions with my or my child's health care provider.   I have requested that the ordered vaccine(s) be given to me or my child.  I understand the benefits and risks of the vaccines.  I understand that I should remain in the clinic for 15 minutes after receiving the vaccine(s).  _________________________________________________________  Signature of Patient or Parent/Legal Guardian ____________________  Date     "

## 2024-03-19 NOTE — ASSESSMENT & PLAN NOTE
- It is stable per last labs. I will obtain an updated lipid panel. He will continue pravastatin.

## 2024-03-20 LAB
ALBUMIN SERPL-MCNC: 4.5 G/DL (ref 3.5–5.2)
ALBUMIN/CREAT UR: 59 MG/G CREAT (ref 0–29)
ALBUMIN/GLOB SERPL: 2 G/DL
ALP SERPL-CCNC: 121 U/L (ref 39–117)
ALT SERPL-CCNC: 22 U/L (ref 1–41)
AST SERPL-CCNC: 18 U/L (ref 1–40)
BASOPHILS # BLD AUTO: 0.04 10*3/MM3 (ref 0–0.2)
BASOPHILS NFR BLD AUTO: 0.6 % (ref 0–1.5)
BILIRUB SERPL-MCNC: 0.7 MG/DL (ref 0–1.2)
BUN SERPL-MCNC: 19 MG/DL (ref 6–20)
BUN/CREAT SERPL: 20.2 (ref 7–25)
CALCIUM SERPL-MCNC: 9 MG/DL (ref 8.6–10.5)
CHLORIDE SERPL-SCNC: 103 MMOL/L (ref 98–107)
CHOLEST SERPL-MCNC: 158 MG/DL (ref 0–200)
CO2 SERPL-SCNC: 24.8 MMOL/L (ref 22–29)
CREAT SERPL-MCNC: 0.94 MG/DL (ref 0.76–1.27)
CREAT UR-MCNC: 45.5 MG/DL
EGFRCR SERPLBLD CKD-EPI 2021: 98.1 ML/MIN/1.73
EOSINOPHIL # BLD AUTO: 0.33 10*3/MM3 (ref 0–0.4)
EOSINOPHIL NFR BLD AUTO: 4.9 % (ref 0.3–6.2)
ERYTHROCYTE [DISTWIDTH] IN BLOOD BY AUTOMATED COUNT: 12.9 % (ref 12.3–15.4)
GLOBULIN SER CALC-MCNC: 2.3 GM/DL
GLUCOSE SERPL-MCNC: 127 MG/DL (ref 65–99)
HBA1C MFR BLD: 7.1 % (ref 4.8–5.6)
HCT VFR BLD AUTO: 49.6 % (ref 37.5–51)
HDLC SERPL-MCNC: 53 MG/DL (ref 40–60)
HGB BLD-MCNC: 16.9 G/DL (ref 13–17.7)
IMM GRANULOCYTES # BLD AUTO: 0.02 10*3/MM3 (ref 0–0.05)
IMM GRANULOCYTES NFR BLD AUTO: 0.3 % (ref 0–0.5)
LDLC SERPL CALC-MCNC: 95 MG/DL (ref 0–100)
LYMPHOCYTES # BLD AUTO: 1.65 10*3/MM3 (ref 0.7–3.1)
LYMPHOCYTES NFR BLD AUTO: 24.3 % (ref 19.6–45.3)
MCH RBC QN AUTO: 30.1 PG (ref 26.6–33)
MCHC RBC AUTO-ENTMCNC: 34.1 G/DL (ref 31.5–35.7)
MCV RBC AUTO: 88.4 FL (ref 79–97)
MICROALBUMIN UR-MCNC: 26.7 UG/ML
MONOCYTES # BLD AUTO: 0.75 10*3/MM3 (ref 0.1–0.9)
MONOCYTES NFR BLD AUTO: 11.1 % (ref 5–12)
NEUTROPHILS # BLD AUTO: 3.99 10*3/MM3 (ref 1.7–7)
NEUTROPHILS NFR BLD AUTO: 58.8 % (ref 42.7–76)
NRBC BLD AUTO-RTO: 0 /100 WBC (ref 0–0.2)
PLATELET # BLD AUTO: 307 10*3/MM3 (ref 140–450)
POTASSIUM SERPL-SCNC: 4.6 MMOL/L (ref 3.5–5.2)
PROT SERPL-MCNC: 6.8 G/DL (ref 6–8.5)
RBC # BLD AUTO: 5.61 10*6/MM3 (ref 4.14–5.8)
SODIUM SERPL-SCNC: 141 MMOL/L (ref 136–145)
TRIGL SERPL-MCNC: 49 MG/DL (ref 0–150)
VLDLC SERPL CALC-MCNC: 10 MG/DL (ref 5–40)
WBC # BLD AUTO: 6.78 10*3/MM3 (ref 3.4–10.8)

## 2024-06-24 ENCOUNTER — OFFICE VISIT (OUTPATIENT)
Dept: INTERNAL MEDICINE | Facility: CLINIC | Age: 54
End: 2024-06-24
Payer: COMMERCIAL

## 2024-06-24 VITALS
OXYGEN SATURATION: 98 % | HEART RATE: 78 BPM | HEIGHT: 66 IN | TEMPERATURE: 98 F | WEIGHT: 171 LBS | BODY MASS INDEX: 27.48 KG/M2 | DIASTOLIC BLOOD PRESSURE: 82 MMHG | SYSTOLIC BLOOD PRESSURE: 122 MMHG

## 2024-06-24 DIAGNOSIS — I10 ESSENTIAL HYPERTENSION: ICD-10-CM

## 2024-06-24 DIAGNOSIS — E11.9 TYPE 2 DIABETES MELLITUS WITHOUT COMPLICATION, WITHOUT LONG-TERM CURRENT USE OF INSULIN: Primary | ICD-10-CM

## 2024-06-24 DIAGNOSIS — K64.9 HEMORRHOIDS, UNSPECIFIED HEMORRHOID TYPE: ICD-10-CM

## 2024-06-24 DIAGNOSIS — F41.9 ANXIETY: ICD-10-CM

## 2024-06-24 LAB
EXPIRATION DATE: ABNORMAL
HBA1C MFR BLD: 7.2 % (ref 4.5–5.7)
Lab: ABNORMAL

## 2024-06-24 PROCEDURE — 99214 OFFICE O/P EST MOD 30 MIN: CPT | Performed by: FAMILY MEDICINE

## 2024-06-24 PROCEDURE — 83036 HEMOGLOBIN GLYCOSYLATED A1C: CPT | Performed by: FAMILY MEDICINE

## 2024-06-24 RX ORDER — MESALAMINE 4 G/60ML
4 SUSPENSION RECTAL NIGHTLY
Qty: 30 ENEMA | Refills: 5 | Status: SHIPPED | OUTPATIENT
Start: 2024-06-24

## 2024-06-24 NOTE — PROGRESS NOTES
Ulices Negron is a 54 y.o. male.    Chief Complaint   Patient presents with    Diabetes    Hypertension    Anxiety       HPI   History of Present Illness  The patient presents today to follow up on diabetes.    He does not monitor his blood glucose levels at home. He has been without Jardiance for over a month due to being unable to refill at the pharmacy. 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 He expresses a desire to resume Jardiance if necessary. He has experienced weight gain, which he attributes to inadequate dietary intake and lack of exercise.     The patient continues to take losartan for hypertension.  BP is controlled in office today.     The patient requests a refill of his mesalamine suppositories, having only a few doses remaining. During his colonoscopy, he was diagnosed with an internal hernia or hemorrhoid, which occasionally bleeds.    The patient reports that BuSpar is effectively managing his anxiety. He typically takes one dose before leaving for work and another before leaving work. He experiences heightened anxiety while driving, which he finds beneficial.    The following portions of the patient's history were reviewed and updated as appropriate: allergies, current medications, past family history, past medical history, past social history, past surgical history and problem list.     No Known Allergies      Current Outpatient Medications:     Blood Glucose Monitoring Suppl (ONE TOUCH ULTRA 2) w/Device kit, USE DAILY, Disp: 1 each, Rfl: 0    busPIRone (BUSPAR) 5 MG tablet, Take 1 tablet by mouth 2 (Two) Times a Day As Needed (anxiety)., Disp: 180 tablet, Rfl: 3    cetirizine (zyrTEC) 10 MG tablet, Take 1 tablet by mouth Daily., Disp: , Rfl:     empagliflozin (Jardiance) 25 MG tablet tablet, Take 1 tablet by mouth Daily., Disp: 90 tablet, Rfl: 3    glucose blood test strip, Use as instructed; E.11.9,  "Disp: 100 each, Rfl: 5    losartan (COZAAR) 50 MG tablet, Take 1 tablet by mouth Daily., Disp: 90 tablet, Rfl: 3    mesalamine (ROWASA) 4 g enema, Insert 1 enema into the rectum Every Night., Disp: 30 enema, Rfl: 5    omeprazole (priLOSEC) 40 MG capsule, TAKE 1 CAPSULE BY MOUTH DAILY, Disp: 90 capsule, Rfl: 1    ONE TOUCH CLUB LANCETS misc, 1 each Daily. E11.9, Disp: 100 each, Rfl: 5    pravastatin (PRAVACHOL) 80 MG tablet, Take 1 tablet by mouth Every Night., Disp: 90 tablet, Rfl: 3    SITagliptin (Januvia) 100 MG tablet, Take 1 tablet by mouth Daily., Disp: 90 tablet, Rfl: 3    triamcinolone (KENALOG) 0.1 % cream, Apply 1 application topically to the appropriate area as directed 2 (Two) Times a Day., Disp: 45 g, Rfl: 1    valACYclovir (VALTREX) 1000 MG tablet, , Disp: , Rfl:     ROS    Review of Systems   Constitutional:  Negative for unexpected weight loss.   Eyes:  Negative for blurred vision.   Respiratory:  Negative for shortness of breath.    Cardiovascular:  Negative for chest pain.   Endocrine: Negative for polydipsia and polyuria.   Neurological:  Positive for tremors. Negative for speech difficulty and confusion.   Psychiatric/Behavioral:  The patient is nervous/anxious.        Vitals:    06/24/24 0903   BP: 122/82   BP Location: Right arm   Patient Position: Sitting   Cuff Size: Adult   Pulse: 78   Temp: 98 °F (36.7 °C)   SpO2: 98%   Weight: 77.6 kg (171 lb)   Height: 167.6 cm (65.98\")   PainSc: 0-No pain         Physical Exam     Physical Exam  Constitutional:       General: He is not in acute distress.     Appearance: Normal appearance. He is well-developed.   HENT:      Head: Normocephalic and atraumatic.      Right Ear: External ear normal.      Left Ear: External ear normal.   Eyes:      Extraocular Movements: Extraocular movements intact.      Conjunctiva/sclera: Conjunctivae normal.   Cardiovascular:      Rate and Rhythm: Normal rate and regular rhythm.      Heart sounds: No murmur " heard.  Pulmonary:      Effort: Pulmonary effort is normal. No respiratory distress.      Breath sounds: Normal breath sounds. No wheezing.   Abdominal:      General: Bowel sounds are normal. There is no distension.      Palpations: Abdomen is soft.      Tenderness: There is no abdominal tenderness.   Musculoskeletal:      Cervical back: Neck supple.      Right lower leg: No edema.      Left lower leg: No edema.   Skin:     General: Skin is warm and dry.      Findings: Bruising (plantar aspect of left foot) present.   Neurological:      Mental Status: He is alert and oriented to person, place, and time.      Cranial Nerves: No cranial nerve deficit.   Psychiatric:         Mood and Affect: Mood normal.         Behavior: Behavior normal.         Assessment/Plan    Diagnoses and all orders for this visit:    1. Type 2 diabetes mellitus without complication, without long-term current use of insulin (Primary)  -     POC Glycosylated Hemoglobin (Hb A1C)    2. Anxiety    3. Hemorrhoids, unspecified hemorrhoid type  -     mesalamine (ROWASA) 4 g enema; Insert 1 enema into the rectum Every Night.  Dispense: 30 enema; Refill: 5    4. Essential hypertension    Other orders  -     empagliflozin (Jardiance) 25 MG tablet tablet; Take 1 tablet by mouth Daily.  Dispense: 90 tablet; Refill: 3        Assessment & Plan  1. Diabetes.  The patient's diabetes is uncontrolled, with an A1c of 7.2. However, the patient has exhausted his supply of Jardiance. The Jardiance prescription will be refilled today, and the patient will continue with Januvia. Regular monitoring of A1c and microalbumin will be conducted every few months. The patient is currently on a statin and ARB.    2. Anxiety.  The patient's condition has improved with the current medication. The patient is advised to take BuSpar 2 to 3 times daily.    3. Hemorrhoid.  The patient will continue with the use of mesalamine suppositories as needed. A refill has been provided  today.    4. Hypertension.  The patient's hypertension is well-managed with current medication. The patient will continue with losartan.    New Medications Ordered This Visit   Medications    empagliflozin (Jardiance) 25 MG tablet tablet     Sig: Take 1 tablet by mouth Daily.     Dispense:  90 tablet     Refill:  3    mesalamine (ROWASA) 4 g enema     Sig: Insert 1 enema into the rectum Every Night.     Dispense:  30 enema     Refill:  5       No orders of the defined types were placed in this encounter.      Return in about 3 months (around 9/24/2024) for diabetes.    Carmen Benitez DO

## 2024-08-22 ENCOUNTER — OFFICE VISIT (OUTPATIENT)
Dept: INTERNAL MEDICINE | Facility: CLINIC | Age: 54
End: 2024-08-22
Payer: COMMERCIAL

## 2024-08-22 ENCOUNTER — HOSPITAL ENCOUNTER (OUTPATIENT)
Dept: GENERAL RADIOLOGY | Facility: HOSPITAL | Age: 54
Discharge: HOME | End: 2024-08-22
Admitting: FAMILY MEDICINE
Payer: COMMERCIAL

## 2024-08-22 VITALS
OXYGEN SATURATION: 98 % | SYSTOLIC BLOOD PRESSURE: 120 MMHG | TEMPERATURE: 97 F | HEART RATE: 76 BPM | WEIGHT: 171 LBS | BODY MASS INDEX: 27.48 KG/M2 | DIASTOLIC BLOOD PRESSURE: 80 MMHG | HEIGHT: 66 IN

## 2024-08-22 DIAGNOSIS — Z12.5 SCREENING PSA (PROSTATE SPECIFIC ANTIGEN): ICD-10-CM

## 2024-08-22 DIAGNOSIS — R35.1 NOCTURIA: ICD-10-CM

## 2024-08-22 DIAGNOSIS — F51.04 PSYCHOPHYSIOLOGICAL INSOMNIA: ICD-10-CM

## 2024-08-22 DIAGNOSIS — H66.003 NON-RECURRENT ACUTE SUPPURATIVE OTITIS MEDIA OF BOTH EARS WITHOUT SPONTANEOUS RUPTURE OF TYMPANIC MEMBRANES: Primary | ICD-10-CM

## 2024-08-22 DIAGNOSIS — M54.2 NECK PAIN: ICD-10-CM

## 2024-08-22 PROCEDURE — 72040 X-RAY EXAM NECK SPINE 2-3 VW: CPT

## 2024-08-22 PROCEDURE — 99214 OFFICE O/P EST MOD 30 MIN: CPT | Performed by: FAMILY MEDICINE

## 2024-08-22 RX ORDER — AMOXICILLIN AND CLAVULANATE POTASSIUM 875; 125 MG/1; MG/1
1 TABLET, FILM COATED ORAL 2 TIMES DAILY
Qty: 20 TABLET | Refills: 0 | Status: SHIPPED | OUTPATIENT
Start: 2024-08-22

## 2024-08-22 RX ORDER — METHOCARBAMOL 500 MG/1
500 TABLET, FILM COATED ORAL 3 TIMES DAILY PRN
Qty: 90 TABLET | Refills: 0 | Status: SHIPPED | OUTPATIENT
Start: 2024-08-22

## 2024-08-22 RX ORDER — METHYLPREDNISOLONE 4 MG/1
TABLET ORAL
Qty: 21 EACH | Refills: 0 | Status: SHIPPED | OUTPATIENT
Start: 2024-08-22

## 2024-08-22 NOTE — PROGRESS NOTES
Ulices Negron is a 54 y.o. male.    Chief Complaint   Patient presents with    Neck Pain     Right side neck pain, starts at base of neck and sends pain into right shoulder. When he turns his head he hears leaf crunching sounds.     Dizziness     When he closes his eyes at night to lay down, or if he looks down. On going for about two weeks. Lasts about a few moments.        HPI   History of Present Illness  The patient presents for evaluation of multiple medical concerns.    He has been experiencing dizziness, particularly when lying down at night or in the morning upon waking. This sensation also occurs when he looks upwards for extended periods or bends down to  objects. He reports no ear pain but suspects fluid accumulation in his ears. His father has a history of vertigo. He is uncertain if his symptoms are related to a sinus infection, which he is prone to. He has had a runny nose since receiving the COVID-19 vaccine but reports no cough, congestion, headache, fever, chills, or sore throat.    He began experiencing neck and shoulder pain approximately 2 weeks ago, followed by the onset of dizziness. The pain, which he describes as a dull ache, extends from the base of his neck to his right shoulder. He likens it to a muscle pull, even though he does not recall any specific incident that could have caused such an injury. Certain head movements exacerbate the pain, which he rates as a 4 on a scale of 0 to 10. He also reports hearing crunching or grinding noises when turning his head. The pain intensifies with activity, and he has not taken any medication for it. He occasionally uses Biofreeze for relief but has not tried it for this particular issue. He has previously taken hydrocodone, which did not alleviate the pain and caused severe headaches after a few days of use.    He reports frequent urination at night, needing to use the bathroom 2 to 3 times per night. Despite not consuming liquids  after 9:00 PM, he struggles to get up and go to the bathroom. Once he wakes up to use the bathroom, he finds it difficult to fall back asleep. He has been awake since 1:30 AM today and feels fatigued. He has been taking Jardiance for several years and recently started a new blood pressure medication. He notes that his urine stream is not as strong as it used to be, but he experiences no pain during urination.      The following portions of the patient's history were reviewed and updated as appropriate: allergies, current medications, past family history, past medical history, past social history, past surgical history and problem list.     No Known Allergies      Current Outpatient Medications:     Blood Glucose Monitoring Suppl (ONE TOUCH ULTRA 2) w/Device kit, USE DAILY, Disp: 1 each, Rfl: 0    busPIRone (BUSPAR) 5 MG tablet, Take 1 tablet by mouth 2 (Two) Times a Day As Needed (anxiety)., Disp: 180 tablet, Rfl: 3    cetirizine (zyrTEC) 10 MG tablet, Take 1 tablet by mouth Daily., Disp: , Rfl:     empagliflozin (Jardiance) 25 MG tablet tablet, Take 1 tablet by mouth Daily., Disp: 90 tablet, Rfl: 3    glucose blood test strip, Use as instructed; E.11.9, Disp: 100 each, Rfl: 5    losartan (COZAAR) 50 MG tablet, Take 1 tablet by mouth Daily., Disp: 90 tablet, Rfl: 3    mesalamine (ROWASA) 4 g enema, Insert 1 enema into the rectum Every Night., Disp: 30 enema, Rfl: 5    omeprazole (priLOSEC) 40 MG capsule, TAKE 1 CAPSULE BY MOUTH DAILY, Disp: 90 capsule, Rfl: 1    ONE TOUCH CLUB LANCETS misc, 1 each Daily. E11.9, Disp: 100 each, Rfl: 5    pravastatin (PRAVACHOL) 80 MG tablet, Take 1 tablet by mouth Every Night., Disp: 90 tablet, Rfl: 3    SITagliptin (Januvia) 100 MG tablet, Take 1 tablet by mouth Daily., Disp: 90 tablet, Rfl: 3    triamcinolone (KENALOG) 0.1 % cream, Apply 1 application topically to the appropriate area as directed 2 (Two) Times a Day., Disp: 45 g, Rfl: 1    valACYclovir (VALTREX) 1000 MG tablet, ,  "Disp: , Rfl:     amoxicillin-clavulanate (AUGMENTIN) 875-125 MG per tablet, Take 1 tablet by mouth 2 (Two) Times a Day., Disp: 20 tablet, Rfl: 0    methocarbamol (ROBAXIN) 500 MG tablet, Take 1 tablet by mouth 3 (Three) Times a Day As Needed for Muscle Spasms., Disp: 90 tablet, Rfl: 0    methylPREDNISolone (MEDROL) 4 MG dose pack, Take as directed on package instructions., Disp: 21 each, Rfl: 0    ROS    Review of Systems   Constitutional:  Negative for chills and fever.   HENT:  Positive for rhinorrhea. Negative for congestion, ear pain and sore throat.    Respiratory:  Negative for cough and shortness of breath.    Musculoskeletal:  Positive for neck pain.   Neurological:  Positive for dizziness.       Vitals:    08/22/24 1532   BP: 120/80   BP Location: Left arm   Patient Position: Sitting   Cuff Size: Adult   Pulse: 76   Temp: 97 °F (36.1 °C)   SpO2: 98%   Weight: 77.6 kg (171 lb)   Height: 167.6 cm (65.98\")         Physical Exam     Physical Exam  Constitutional:       General: He is not in acute distress.     Appearance: Normal appearance. He is well-developed.   HENT:      Head: Normocephalic and atraumatic.      Right Ear: External ear normal. A middle ear effusion (purulent) is present.      Left Ear: External ear normal. A middle ear effusion (purulent) is present.   Eyes:      Extraocular Movements: Extraocular movements intact.      Conjunctiva/sclera: Conjunctivae normal.   Cardiovascular:      Rate and Rhythm: Normal rate and regular rhythm.      Heart sounds: No murmur heard.  Pulmonary:      Effort: Pulmonary effort is normal. No respiratory distress.      Breath sounds: Normal breath sounds. No wheezing.   Abdominal:      General: Bowel sounds are normal. There is no distension.      Palpations: Abdomen is soft.      Tenderness: There is no abdominal tenderness.   Musculoskeletal:      Cervical back: Decreased range of motion.      Right lower leg: No edema.      Left lower leg: No edema.   Skin:    "  General: Skin is warm and dry.   Neurological:      Mental Status: He is alert and oriented to person, place, and time.      Cranial Nerves: No cranial nerve deficit.   Psychiatric:         Mood and Affect: Mood normal.         Behavior: Behavior normal.         Assessment/Plan    Diagnoses and all orders for this visit:    1. Non-recurrent acute suppurative otitis media of both ears without spontaneous rupture of tympanic membranes (Primary)    2. Neck pain  -     XR Spine Cervical 2 or 3 View    3. Nocturia  -     PSA Screen    4. Screening PSA (prostate specific antigen)  -     PSA Screen    5. Psychophysiological insomnia    Other orders  -     methocarbamol (ROBAXIN) 500 MG tablet; Take 1 tablet by mouth 3 (Three) Times a Day As Needed for Muscle Spasms.  Dispense: 90 tablet; Refill: 0  -     amoxicillin-clavulanate (AUGMENTIN) 875-125 MG per tablet; Take 1 tablet by mouth 2 (Two) Times a Day.  Dispense: 20 tablet; Refill: 0  -     methylPREDNISolone (MEDROL) 4 MG dose pack; Take as directed on package instructions.  Dispense: 21 each; Refill: 0        Assessment & Plan  1. Bilateral otitis media.  This is likely the source of his vertigo. Augmentin and a short course of steroids will be prescribed.     2. Neck pain.  This is likely muscular in origin. Robaxin will be prescribed as a muscle relaxer, and he is advised to use it at home initially to monitor for drowsiness. Topical analgesics such as Biofreeze or Salonpas patches may also be applied. An x-ray of the neck has been ordered to rule out any other underlying issues.    3. Nocturia.  This may be secondary to an enlarged prostate. A PSA test will be obtained to rule out prostate cancer. If the PSA is normal, a sleeping medication may be considered.    4. Insomnia.  If the PSA is unremarkable, trazodone or amitriptyline will be considered to help with sleep. He is experiencing frequent nighttime urination, which may be contributing to his  insomnia.      New Medications Ordered This Visit   Medications    methocarbamol (ROBAXIN) 500 MG tablet     Sig: Take 1 tablet by mouth 3 (Three) Times a Day As Needed for Muscle Spasms.     Dispense:  90 tablet     Refill:  0    amoxicillin-clavulanate (AUGMENTIN) 875-125 MG per tablet     Sig: Take 1 tablet by mouth 2 (Two) Times a Day.     Dispense:  20 tablet     Refill:  0    methylPREDNISolone (MEDROL) 4 MG dose pack     Sig: Take as directed on package instructions.     Dispense:  21 each     Refill:  0       No orders of the defined types were placed in this encounter.      No follow-ups on file.    Carmen Benitez DO

## 2024-08-23 LAB — PSA SERPL-MCNC: 1.6 NG/ML (ref 0–4)

## 2024-09-03 ENCOUNTER — TELEPHONE (OUTPATIENT)
Dept: INTERNAL MEDICINE | Facility: CLINIC | Age: 54
End: 2024-09-03
Payer: COMMERCIAL

## 2024-09-03 RX ORDER — MECLIZINE HYDROCHLORIDE 25 MG/1
25 TABLET ORAL 3 TIMES DAILY PRN
Qty: 30 TABLET | Refills: 0 | Status: SHIPPED | OUTPATIENT
Start: 2024-09-03

## 2024-09-03 NOTE — TELEPHONE ENCOUNTER
Caller: Ulices Negron    Relationship: Self    Best call back number: [phone]     What medication are you requesting: SOMETHING FOR SYMPTOMS    What are your current symptoms: DIZZINESS    How long have you been experiencing symptoms: ABOUT 3 WEEKS    Have you had these symptoms before:    [x] Yes  [] No    Have you been treated for these symptoms before:   [x] Yes  [] No    If a prescription is needed, what is your preferred pharmacy and phone number: [facility] [id number] - [place] [address] AT [facility] [phone] [facility] [phone]      Additional notes: PATIENT WAS PRESCRIBED ANTIBIOTICS LAST WEEK AND NOW HE IS EXPERIENCING SAME SYMPTOMS AGAIN. PATIENT STATES THAT THE SYMPTOMS WENT AWAY WHILE ON THE ANTIBIOTICS.

## 2024-09-04 RX ORDER — SITAGLIPTIN 100 MG/1
TABLET, FILM COATED ORAL
Qty: 30 TABLET | Refills: 3 | Status: SHIPPED | OUTPATIENT
Start: 2024-09-04

## 2024-09-05 ENCOUNTER — OFFICE VISIT (OUTPATIENT)
Dept: INTERNAL MEDICINE | Facility: CLINIC | Age: 54
End: 2024-09-05
Payer: COMMERCIAL

## 2024-09-05 VITALS
BODY MASS INDEX: 27.48 KG/M2 | DIASTOLIC BLOOD PRESSURE: 76 MMHG | WEIGHT: 171 LBS | HEART RATE: 76 BPM | HEIGHT: 66 IN | TEMPERATURE: 98 F | SYSTOLIC BLOOD PRESSURE: 118 MMHG | OXYGEN SATURATION: 97 %

## 2024-09-05 DIAGNOSIS — H66.004 RECURRENT ACUTE SUPPURATIVE OTITIS MEDIA OF RIGHT EAR WITHOUT SPONTANEOUS RUPTURE OF TYMPANIC MEMBRANE: Primary | ICD-10-CM

## 2024-09-05 PROCEDURE — 99213 OFFICE O/P EST LOW 20 MIN: CPT | Performed by: FAMILY MEDICINE

## 2024-09-05 RX ORDER — CEFDINIR 300 MG/1
300 CAPSULE ORAL 2 TIMES DAILY
Qty: 20 CAPSULE | Refills: 0 | Status: SHIPPED | OUTPATIENT
Start: 2024-09-05

## 2024-09-05 RX ORDER — CIPROFLOXACIN AND DEXAMETHASONE 3; 1 MG/ML; MG/ML
4 SUSPENSION/ DROPS AURICULAR (OTIC) 2 TIMES DAILY
Qty: 7.5 ML | Refills: 0 | Status: SHIPPED | OUTPATIENT
Start: 2024-09-05

## 2024-09-05 NOTE — PROGRESS NOTES
Ulices Negron is a 54 y.o. male.    Chief Complaint   Patient presents with    Dizziness     Is worse now than it was.     Ear Fullness     Feels like his ears have gotten worse, like he may have a sinus infection        HPI   History of Present Illness  The patient presents today with concern for a recurrent ear infection.    He reports a persistent ear infection that briefly improved over the holiday weekend but returned before he could complete his medication course. He experiences constant tinnitus, which he says is not unusual for him. He does not experience any ear pain but mentions feeling dizzy when he lays his head back or bends it down. He has been on steroids for treatment. He is unsure about having a fever as he has not checked his temperature. He does not have any cough, congestion, or runny nose. He has previously taken penicillin, which was ineffective.    He avoids nasal sprays due to associated headaches but occasionally uses a medicated saline spray at night. He has been unwell for a month and is not taking any over-the-counter cold or cough medicine.He suspects his symptoms may be progressing into a sinus infection. He recalls an incident where he woke up unable to breathe from one side after sleeping on it, and his eyes were itchy.    The following portions of the patient's history were reviewed and updated as appropriate: allergies, current medications, past family history, past medical history, past social history, past surgical history and problem list.     No Known Allergies      Current Outpatient Medications:     Blood Glucose Monitoring Suppl (ONE TOUCH ULTRA 2) w/Device kit, USE DAILY, Disp: 1 each, Rfl: 0    busPIRone (BUSPAR) 5 MG tablet, Take 1 tablet by mouth 2 (Two) Times a Day As Needed (anxiety)., Disp: 180 tablet, Rfl: 3    cetirizine (zyrTEC) 10 MG tablet, Take 1 tablet by mouth Daily., Disp: , Rfl:     empagliflozin (Jardiance) 25 MG tablet tablet, Take 1 tablet by mouth  "Daily., Disp: 90 tablet, Rfl: 3    glucose blood test strip, Use as instructed; E.11.9, Disp: 100 each, Rfl: 5    losartan (COZAAR) 50 MG tablet, Take 1 tablet by mouth Daily., Disp: 90 tablet, Rfl: 3    meclizine (ANTIVERT) 25 MG tablet, Take 1 tablet by mouth 3 (Three) Times a Day As Needed for Dizziness., Disp: 30 tablet, Rfl: 0    mesalamine (ROWASA) 4 g enema, Insert 1 enema into the rectum Every Night., Disp: 30 enema, Rfl: 5    omeprazole (priLOSEC) 40 MG capsule, TAKE 1 CAPSULE BY MOUTH DAILY, Disp: 90 capsule, Rfl: 1    ONE TOUCH CLUB LANCETS misc, 1 each Daily. E11.9, Disp: 100 each, Rfl: 5    pravastatin (PRAVACHOL) 80 MG tablet, Take 1 tablet by mouth Every Night., Disp: 90 tablet, Rfl: 3    SITagliptin (Januvia) 100 MG tablet, TAKE 1 TABLET BY MOUTH DAILY; NEED AN APPOINTMENT FOR FURTHER REFILLS, Disp: 30 tablet, Rfl: 3    cefdinir (OMNICEF) 300 MG capsule, Take 1 capsule by mouth 2 (Two) Times a Day., Disp: 20 capsule, Rfl: 0    ciprofloxacin-dexAMETHasone (Ciprodex) 0.3-0.1 % otic suspension, Administer 4 drops to the right ear 2 (Two) Times a Day., Disp: 7.5 mL, Rfl: 0    ROS    Review of Systems   HENT:  Positive for congestion, ear pain, hearing loss, rhinorrhea, sore throat and tinnitus.    Respiratory:  Positive for cough.    Musculoskeletal:  Positive for neck pain.   Skin:  Negative for rash.   Neurological:  Positive for dizziness.   Hematological:  Negative for adenopathy.       Vitals:    09/05/24 1330   BP: 118/76   BP Location: Right arm   Patient Position: Sitting   Cuff Size: Adult   Pulse: 76   Temp: 98 °F (36.7 °C)   SpO2: 97%   Weight: 77.6 kg (171 lb)   Height: 167.6 cm (65.98\")   PainSc: 0-No pain         Physical Exam     Physical Exam  Constitutional:       General: He is not in acute distress.     Appearance: Normal appearance. He is well-developed.   HENT:      Head: Normocephalic and atraumatic.      Right Ear: External ear normal. A middle ear effusion is present. Tympanic " membrane is erythematous.      Left Ear: External ear normal.      Mouth/Throat:      Pharynx: Posterior oropharyngeal erythema present.   Eyes:      Extraocular Movements: Extraocular movements intact.      Conjunctiva/sclera: Conjunctivae normal.   Cardiovascular:      Rate and Rhythm: Normal rate and regular rhythm.      Heart sounds: No murmur heard.  Pulmonary:      Effort: Pulmonary effort is normal. No respiratory distress.      Breath sounds: Normal breath sounds. No wheezing.   Abdominal:      General: Bowel sounds are normal. There is no distension.      Palpations: Abdomen is soft.      Tenderness: There is no abdominal tenderness.   Musculoskeletal:      Right lower leg: No edema.      Left lower leg: No edema.   Skin:     General: Skin is warm and dry.   Neurological:      Mental Status: He is alert and oriented to person, place, and time.      Cranial Nerves: No cranial nerve deficit.   Psychiatric:         Mood and Affect: Mood normal.         Behavior: Behavior normal.             Diagnoses and all orders for this visit:    1. Recurrent acute suppurative otitis media of right ear without spontaneous rupture of tympanic membrane (Primary)    Other orders  -     cefdinir (OMNICEF) 300 MG capsule; Take 1 capsule by mouth 2 (Two) Times a Day.  Dispense: 20 capsule; Refill: 0  -     ciprofloxacin-dexAMETHasone (Ciprodex) 0.3-0.1 % otic suspension; Administer 4 drops to the right ear 2 (Two) Times a Day.  Dispense: 7.5 mL; Refill: 0        Assessment & Plan  1. Right Otitis Media.  His dizziness is likely a result of this condition. Cefdinir 300 mg will be administered twice a day for 10 days. He is advised to perform sinus rinses and take Coricidin HBP. Ciprodex drops will also be used, with 4 drops in the right ear twice a day. If the Ciprodex drops are unavailable or not covered, he should notify the office.        New Medications Ordered This Visit   Medications    cefdinir (OMNICEF) 300 MG capsule      Sig: Take 1 capsule by mouth 2 (Two) Times a Day.     Dispense:  20 capsule     Refill:  0    ciprofloxacin-dexAMETHasone (Ciprodex) 0.3-0.1 % otic suspension     Sig: Administer 4 drops to the right ear 2 (Two) Times a Day.     Dispense:  7.5 mL     Refill:  0       No orders of the defined types were placed in this encounter.      No follow-ups on file.    Carmen Benitez, DO

## 2024-09-13 RX ORDER — OMEPRAZOLE 40 MG/1
40 CAPSULE, DELAYED RELEASE ORAL DAILY
Qty: 90 CAPSULE | Refills: 3 | Status: SHIPPED | OUTPATIENT
Start: 2024-09-13

## 2024-09-30 ENCOUNTER — OFFICE VISIT (OUTPATIENT)
Dept: INTERNAL MEDICINE | Facility: CLINIC | Age: 54
End: 2024-09-30
Payer: COMMERCIAL

## 2024-09-30 VITALS
OXYGEN SATURATION: 97 % | TEMPERATURE: 98 F | BODY MASS INDEX: 27.64 KG/M2 | DIASTOLIC BLOOD PRESSURE: 82 MMHG | HEIGHT: 66 IN | HEART RATE: 78 BPM | SYSTOLIC BLOOD PRESSURE: 124 MMHG | WEIGHT: 172 LBS

## 2024-09-30 DIAGNOSIS — R42 DIZZINESS: ICD-10-CM

## 2024-09-30 DIAGNOSIS — J30.9 ALLERGIC RHINITIS, UNSPECIFIED SEASONALITY, UNSPECIFIED TRIGGER: ICD-10-CM

## 2024-09-30 DIAGNOSIS — H60.93 RECURRENT OTITIS EXTERNA OF BOTH EARS: ICD-10-CM

## 2024-09-30 DIAGNOSIS — E11.9 TYPE 2 DIABETES MELLITUS WITHOUT COMPLICATION, WITHOUT LONG-TERM CURRENT USE OF INSULIN: Primary | ICD-10-CM

## 2024-09-30 LAB
EXPIRATION DATE: ABNORMAL
HBA1C MFR BLD: 6.8 % (ref 4.5–5.7)
Lab: ABNORMAL

## 2024-09-30 PROCEDURE — 99214 OFFICE O/P EST MOD 30 MIN: CPT | Performed by: FAMILY MEDICINE

## 2024-09-30 PROCEDURE — 83036 HEMOGLOBIN GLYCOSYLATED A1C: CPT | Performed by: FAMILY MEDICINE

## 2024-09-30 RX ORDER — BUSPIRONE HYDROCHLORIDE 5 MG/1
5 TABLET ORAL 2 TIMES DAILY PRN
Qty: 60 TABLET | OUTPATIENT
Start: 2024-09-30

## 2024-09-30 RX ORDER — BUSPIRONE HYDROCHLORIDE 5 MG/1
5 TABLET ORAL 2 TIMES DAILY PRN
Qty: 180 TABLET | Refills: 3 | Status: SHIPPED | OUTPATIENT
Start: 2024-09-30

## 2024-09-30 RX ORDER — PRAVASTATIN SODIUM 80 MG/1
80 TABLET ORAL NIGHTLY
Qty: 90 TABLET | Refills: 3 | Status: SHIPPED | OUTPATIENT
Start: 2024-09-30

## 2024-09-30 RX ORDER — LOSARTAN POTASSIUM 50 MG/1
50 TABLET ORAL DAILY
Qty: 90 TABLET | Refills: 3 | Status: SHIPPED | OUTPATIENT
Start: 2024-09-30

## 2024-09-30 RX ORDER — MONTELUKAST SODIUM 10 MG/1
10 TABLET ORAL NIGHTLY
Qty: 90 TABLET | Refills: 3 | Status: SHIPPED | OUTPATIENT
Start: 2024-09-30

## 2024-09-30 NOTE — PROGRESS NOTES
Ulices Negron is a 54 y.o. male.    Chief Complaint   Patient presents with    Diabetes       HPI   History of Present Illness  The patient presents today to follow up on diabetes.    He does not monitor his blood sugar levels at home. He reports no symptoms of hypoglycemia such as irritability, anger, shakiness, or dizziness. His current medications include Januvia and Jardiance, the latter of which he believes increases his urination frequency to nearly 10 times a day. He maintains a high water intake and avoids excessive consumption of sugars and carbohydrates.    He experiences dizziness, particularly when lying down, which he describes as a brief sensation of spinning that quickly subsides. He has not taken the prescribed meclizine due to concerns about its effects on his work performance.    He also reports persistent ear fullness and significant nasal congestion, which he manages with daily Zyrtec. He has not tried Singulair. He experienced a severe headache today, which he attributes to sinus pressure in his nose and ears. He uses a nasal spray to alleviate this pressure, which typically resolves his headaches. However, today's headache persisted, possibly due to his lack of coffee intake this morning.        The following portions of the patient's history were reviewed and updated as appropriate: allergies, current medications, past family history, past medical history, past social history, past surgical history and problem list.     No Known Allergies      Current Outpatient Medications:     Blood Glucose Monitoring Suppl (ONE TOUCH ULTRA 2) w/Device kit, USE DAILY, Disp: 1 each, Rfl: 0    busPIRone (BUSPAR) 5 MG tablet, Take 1 tablet by mouth 2 (Two) Times a Day As Needed (anxiety)., Disp: 180 tablet, Rfl: 3    cetirizine (zyrTEC) 10 MG tablet, Take 1 tablet by mouth Daily., Disp: , Rfl:     ciprofloxacin-dexAMETHasone (Ciprodex) 0.3-0.1 % otic suspension, Administer 4 drops to the right ear 2  "(Two) Times a Day., Disp: 7.5 mL, Rfl: 0    empagliflozin (Jardiance) 25 MG tablet tablet, Take 1 tablet by mouth Daily., Disp: 90 tablet, Rfl: 3    glucose blood test strip, Use as instructed; E.11.9, Disp: 100 each, Rfl: 5    losartan (COZAAR) 50 MG tablet, Take 1 tablet by mouth Daily., Disp: 90 tablet, Rfl: 3    meclizine (ANTIVERT) 25 MG tablet, Take 1 tablet by mouth 3 (Three) Times a Day As Needed for Dizziness., Disp: 30 tablet, Rfl: 0    mesalamine (ROWASA) 4 g enema, Insert 1 enema into the rectum Every Night., Disp: 30 enema, Rfl: 5    omeprazole (priLOSEC) 40 MG capsule, TAKE 1 CAPSULE BY MOUTH DAILY, Disp: 90 capsule, Rfl: 3    ONE TOUCH CLUB LANCETS misc, 1 each Daily. E11.9, Disp: 100 each, Rfl: 5    pravastatin (PRAVACHOL) 80 MG tablet, Take 1 tablet by mouth Every Night., Disp: 90 tablet, Rfl: 3    SITagliptin (Januvia) 100 MG tablet, TAKE 1 TABLET BY MOUTH DAILY; NEED AN APPOINTMENT FOR FURTHER REFILLS, Disp: 30 tablet, Rfl: 3    montelukast (Singulair) 10 MG tablet, Take 1 tablet by mouth Every Night., Disp: 90 tablet, Rfl: 3    ROS    Review of Systems   Constitutional:  Negative for chills and fever.   HENT:  Positive for congestion and sinus pressure.         Ear fullness   Respiratory:  Negative for shortness of breath.    Cardiovascular:  Negative for chest pain.   Gastrointestinal:  Negative for abdominal pain, constipation, diarrhea, nausea and vomiting.   Neurological:  Positive for dizziness.       Vitals:    09/30/24 0849   BP: 124/82   BP Location: Right arm   Patient Position: Sitting   Cuff Size: Adult   Pulse: 78   Temp: 98 °F (36.7 °C)   SpO2: 97%   Weight: 78 kg (172 lb)   Height: 167.6 cm (65.98\")         Physical Exam     Physical Exam  Constitutional:       General: He is not in acute distress.     Appearance: Normal appearance. He is well-developed.   HENT:      Head: Normocephalic and atraumatic.      Right Ear: External ear normal. A middle ear effusion (purulent) is present. "      Left Ear: External ear normal. A middle ear effusion (purulent) is present.      Mouth/Throat:      Pharynx: Posterior oropharyngeal erythema (trace PND) present.   Eyes:      Extraocular Movements: Extraocular movements intact.      Conjunctiva/sclera: Conjunctivae normal.   Cardiovascular:      Rate and Rhythm: Normal rate and regular rhythm.      Heart sounds: No murmur heard.  Pulmonary:      Effort: Pulmonary effort is normal. No respiratory distress.      Breath sounds: Normal breath sounds. No wheezing.   Abdominal:      General: Bowel sounds are normal. There is no distension.      Palpations: Abdomen is soft.      Tenderness: There is no abdominal tenderness.   Musculoskeletal:      Right lower leg: No edema.      Left lower leg: No edema.   Skin:     General: Skin is warm and dry.   Neurological:      Mental Status: He is alert and oriented to person, place, and time.      Cranial Nerves: No cranial nerve deficit.   Psychiatric:         Mood and Affect: Mood normal.         Behavior: Behavior normal.         Assessment/Plan    Diagnoses and all orders for this visit:    1. Type 2 diabetes mellitus without complication, without long-term current use of insulin (Primary)  -     POC Glycosylated Hemoglobin (Hb A1C)    2. Dizziness    3. Recurrent otitis externa of both ears  -     Ambulatory Referral to ENT (Otolaryngology)    4. Allergic rhinitis, unspecified seasonality, unspecified trigger  -     Ambulatory Referral to ENT (Otolaryngology)    Other orders  -     montelukast (Singulair) 10 MG tablet; Take 1 tablet by mouth Every Night.  Dispense: 90 tablet; Refill: 3  -     pravastatin (PRAVACHOL) 80 MG tablet; Take 1 tablet by mouth Every Night.  Dispense: 90 tablet; Refill: 3  -     losartan (COZAAR) 50 MG tablet; Take 1 tablet by mouth Daily.  Dispense: 90 tablet; Refill: 3  -     busPIRone (BUSPAR) 5 MG tablet; Take 1 tablet by mouth 2 (Two) Times a Day As Needed (anxiety).  Dispense: 180 tablet;  Refill: 3        Assessment & Plan  1. Diabetes Mellitus.  His A1c level, measured in the office today, is 6.8, indicating good control of his diabetes. He will maintain his current regimen of Jardiance and Januvia. He reports no significant side effects from these medications, except for increased urination with Jardiance.     2. Dizziness.  Likely secondary to recurrent otitis media. Has not taken the prescribed meclizine due to concerns about its effects while working.     3. Recurrent Otitis Media.  He reports ear fullness and fluid in his ears. Examination reveals cloudy fluid in the ears. Singulair will be added to his existing Zyrtec regimen to help manage his symptoms. He is advised to continue using nasal spray as needed for congestion relief. A referral to an ENT specialist has been arranged for further evaluation.    4. Allergic Rhinitis.  He reports daily nasal congestion and clear nasal discharge. Singulair will be added to his existing Zyrtec regimen to help manage his symptoms. He is advised to continue using nasal spray as needed for congestion relief.      Follow-up  Return in 6 months for regular follow-up.    New Medications Ordered This Visit   Medications    montelukast (Singulair) 10 MG tablet     Sig: Take 1 tablet by mouth Every Night.     Dispense:  90 tablet     Refill:  3    pravastatin (PRAVACHOL) 80 MG tablet     Sig: Take 1 tablet by mouth Every Night.     Dispense:  90 tablet     Refill:  3    losartan (COZAAR) 50 MG tablet     Sig: Take 1 tablet by mouth Daily.     Dispense:  90 tablet     Refill:  3    busPIRone (BUSPAR) 5 MG tablet     Sig: Take 1 tablet by mouth 2 (Two) Times a Day As Needed (anxiety).     Dispense:  180 tablet     Refill:  3       No orders of the defined types were placed in this encounter.      Return in about 6 months (around 3/30/2025) for diabetes, HTN, HPL.    Carmen Benitez DO

## 2024-11-15 ENCOUNTER — TELEPHONE (OUTPATIENT)
Dept: INTERNAL MEDICINE | Facility: CLINIC | Age: 54
End: 2024-11-15
Payer: COMMERCIAL

## 2024-11-15 RX ORDER — METHOCARBAMOL 500 MG/1
500 TABLET, FILM COATED ORAL 4 TIMES DAILY
Qty: 120 TABLET | Refills: 2 | Status: SHIPPED | OUTPATIENT
Start: 2024-11-15

## 2024-11-15 NOTE — TELEPHONE ENCOUNTER
Caller: Ulices Negron    Relationship: Self    Best call back number: [phone]     Requested Prescriptions:      METHOCARBAMOL 500 MG      Pharmacy where request should be sent: [facility] [id number] [place] [address] [address] [place] [facility] [place] [facility] [place] - [phone]  - [phone] FX     Last office visit with prescribing clinician: 9/30/2024   Last telemedicine visit with prescribing clinician: Visit date not found   Next office visit with prescribing clinician: 3/31/2025     Additional details provided by patient: PATIENT HAS 2 DAYS LEFT. THIS MEDICATION WAS PRESCRIBED AS TRIAL BASIS. THE PATIENT WOULD LIKE TO CONTINUE TO TAKE THIS MEDICATION AS HE STATES IT HAS HELPED HIM A LOT    Does the patient have less than a 3 day supply:  [x] Yes  [] No    Would you like a call back once the refill request has been completed: [] Yes [x] No    If the office needs to give you a call back, can they leave a voicemail: [] Yes [x] No    Austyn Velasquez Rep   11/15/24 11:20 EST

## 2024-12-31 RX ORDER — SITAGLIPTIN 100 MG/1
TABLET, FILM COATED ORAL
Qty: 90 TABLET | Refills: 3 | Status: SHIPPED | OUTPATIENT
Start: 2024-12-31

## 2025-03-03 ENCOUNTER — HOSPITAL ENCOUNTER (OUTPATIENT)
Dept: GENERAL RADIOLOGY | Facility: HOSPITAL | Age: 55
Discharge: HOME | End: 2025-03-03
Admitting: FAMILY MEDICINE
Payer: COMMERCIAL

## 2025-03-03 ENCOUNTER — OFFICE VISIT (OUTPATIENT)
Dept: INTERNAL MEDICINE | Facility: CLINIC | Age: 55
End: 2025-03-03
Payer: COMMERCIAL

## 2025-03-03 VITALS
DIASTOLIC BLOOD PRESSURE: 82 MMHG | BODY MASS INDEX: 27.32 KG/M2 | HEART RATE: 74 BPM | OXYGEN SATURATION: 98 % | TEMPERATURE: 98 F | HEIGHT: 66 IN | SYSTOLIC BLOOD PRESSURE: 122 MMHG | WEIGHT: 170 LBS

## 2025-03-03 DIAGNOSIS — M25.512 ACUTE PAIN OF LEFT SHOULDER: Primary | ICD-10-CM

## 2025-03-03 DIAGNOSIS — K21.9 GASTROESOPHAGEAL REFLUX DISEASE WITHOUT ESOPHAGITIS: ICD-10-CM

## 2025-03-03 PROCEDURE — 73030 X-RAY EXAM OF SHOULDER: CPT

## 2025-03-03 PROCEDURE — 99214 OFFICE O/P EST MOD 30 MIN: CPT | Performed by: FAMILY MEDICINE

## 2025-03-03 RX ORDER — MELOXICAM 7.5 MG/1
7.5 TABLET ORAL DAILY
Qty: 30 TABLET | Refills: 0 | Status: SHIPPED | OUTPATIENT
Start: 2025-03-03

## 2025-03-03 NOTE — PROGRESS NOTES
Ulices Negron is a 54 y.o. male.    Chief Complaint   Patient presents with    Shoulder Pain     Left shoulder. On going for two weeks.  Woke up one morning with the pain. Can be sharp and shooting.        HPI   History of Present Illness  The patient presents with left shoulder pain, first noticed upon awakening. The pain has persisted for weeks without significant change. Suspects overexertion at work as the cause. Reports difficulty with tasks like reaching for his phone or washing his back. Pain is more severe when lying down. Manages pain with ibuprofen, Tylenol, and methocarbamol.    Takes methocarbamol nightly for leg cramps, which has been effective.    Experienced aspiration three times during sleep last night.. Takes daily omeprazole.        The following portions of the patient's history were reviewed and updated as appropriate: allergies, current medications, past family history, past medical history, past social history, past surgical history and problem list.     No Known Allergies      Current Outpatient Medications:     Blood Glucose Monitoring Suppl (ONE TOUCH ULTRA 2) w/Device kit, USE DAILY, Disp: 1 each, Rfl: 0    busPIRone (BUSPAR) 5 MG tablet, Take 1 tablet by mouth 2 (Two) Times a Day As Needed (anxiety)., Disp: 180 tablet, Rfl: 3    cetirizine (zyrTEC) 10 MG tablet, Take 1 tablet by mouth Daily., Disp: , Rfl:     empagliflozin (Jardiance) 25 MG tablet tablet, Take 1 tablet by mouth Daily., Disp: 90 tablet, Rfl: 3    glucose blood test strip, Use as instructed; E.11.9, Disp: 100 each, Rfl: 5    losartan (COZAAR) 50 MG tablet, Take 1 tablet by mouth Daily., Disp: 90 tablet, Rfl: 3    meclizine (ANTIVERT) 25 MG tablet, Take 1 tablet by mouth 3 (Three) Times a Day As Needed for Dizziness., Disp: 30 tablet, Rfl: 0    mesalamine (ROWASA) 4 g enema, Insert 1 enema into the rectum Every Night., Disp: 30 enema, Rfl: 5    methocarbamol (ROBAXIN) 500 MG tablet, Take 1 tablet by mouth 4 (Four)  "Times a Day., Disp: 120 tablet, Rfl: 2    montelukast (Singulair) 10 MG tablet, Take 1 tablet by mouth Every Night., Disp: 90 tablet, Rfl: 3    omeprazole (priLOSEC) 40 MG capsule, TAKE 1 CAPSULE BY MOUTH DAILY, Disp: 90 capsule, Rfl: 3    ONE TOUCH CLUB LANCETS misc, 1 each Daily. E11.9, Disp: 100 each, Rfl: 5    pravastatin (PRAVACHOL) 80 MG tablet, Take 1 tablet by mouth Every Night., Disp: 90 tablet, Rfl: 3    SITagliptin (Januvia) 100 MG tablet, TAKE 1 TABLET BY MOUTH DAILY; NEED AN APPOINTMENT FOR FURTHER REFILLS, Disp: 90 tablet, Rfl: 3    meloxicam (Mobic) 7.5 MG tablet, Take 1 tablet by mouth Daily., Disp: 30 tablet, Rfl: 0    ROS    Review of Systems   Constitutional:  Positive for fatigue. Negative for chills, diaphoresis and fever.   HENT:  Negative for congestion, sore throat and swollen glands.    Respiratory:  Negative for cough.    Cardiovascular:  Negative for chest pain.   Gastrointestinal:  Negative for abdominal pain, nausea and vomiting.   Genitourinary:  Negative for dysuria.   Musculoskeletal:  Positive for arthralgias (left shoulder) and myalgias. Negative for neck pain.   Skin:  Negative for rash.   Neurological:  Negative for weakness and numbness.       Vitals:    03/03/25 1403   BP: 122/82   Pulse: 74   Temp: 98 °F (36.7 °C)   SpO2: 98%   Weight: 77.1 kg (170 lb)   Height: 167.6 cm (65.98\")   PainSc: 3          Physical Exam     Physical Exam  Constitutional:       General: He is not in acute distress.     Appearance: Normal appearance. He is well-developed.   HENT:      Head: Normocephalic and atraumatic.      Right Ear: External ear normal.      Left Ear: External ear normal.   Eyes:      Extraocular Movements: Extraocular movements intact.      Conjunctiva/sclera: Conjunctivae normal.   Cardiovascular:      Rate and Rhythm: Normal rate.   Pulmonary:      Effort: Pulmonary effort is normal. No respiratory distress.   Abdominal:      General: There is no distension.   Musculoskeletal:    "   Left shoulder: Tenderness and bony tenderness present. Decreased range of motion. Decreased strength.      Right lower leg: No edema.      Left lower leg: No edema.   Skin:     General: Skin is warm and dry.   Neurological:      Mental Status: He is alert and oriented to person, place, and time.      Cranial Nerves: No cranial nerve deficit.   Psychiatric:         Mood and Affect: Mood normal.         Behavior: Behavior normal.         Assessment/Plan    Diagnoses and all orders for this visit:    1. Acute pain of left shoulder (Primary)  -     XR Shoulder 2+ View Left    2. Gastroesophageal reflux disease without esophagitis    Other orders  -     meloxicam (Mobic) 7.5 MG tablet; Take 1 tablet by mouth Daily.  Dispense: 30 tablet; Refill: 0        Assessment & Plan  1. Left shoulder pain. Obtain x-ray. Discontinue ibuprofen, start meloxicam daily. Continue methocarbamol. Possible referral to physical therapy or joint injection based on x-ray results.    2. Gastroesophageal reflux disease. Experienced aspiration, possibly related to ibuprofen. Discontinue ibuprofen, start meloxicam. Continue omeprazole.        New Medications Ordered This Visit   Medications    meloxicam (Mobic) 7.5 MG tablet     Sig: Take 1 tablet by mouth Daily.     Dispense:  30 tablet     Refill:  0       No orders of the defined types were placed in this encounter.      Return if symptoms worsen or fail to improve, for Next scheduled follow up.    Carmen Benitez DO

## 2025-03-06 DIAGNOSIS — M25.512 ACUTE PAIN OF LEFT SHOULDER: Primary | ICD-10-CM

## 2025-03-31 ENCOUNTER — OFFICE VISIT (OUTPATIENT)
Dept: INTERNAL MEDICINE | Facility: CLINIC | Age: 55
End: 2025-03-31
Payer: COMMERCIAL

## 2025-03-31 VITALS
BODY MASS INDEX: 28.77 KG/M2 | TEMPERATURE: 98 F | HEART RATE: 74 BPM | SYSTOLIC BLOOD PRESSURE: 128 MMHG | DIASTOLIC BLOOD PRESSURE: 86 MMHG | OXYGEN SATURATION: 99 % | WEIGHT: 179 LBS | HEIGHT: 66 IN

## 2025-03-31 DIAGNOSIS — E11.9 TYPE 2 DIABETES MELLITUS WITHOUT COMPLICATION, WITHOUT LONG-TERM CURRENT USE OF INSULIN: Primary | ICD-10-CM

## 2025-03-31 DIAGNOSIS — E78.5 HYPERLIPIDEMIA, UNSPECIFIED HYPERLIPIDEMIA TYPE: ICD-10-CM

## 2025-03-31 DIAGNOSIS — M25.512 ACUTE PAIN OF LEFT SHOULDER: ICD-10-CM

## 2025-03-31 DIAGNOSIS — E66.3 OVERWEIGHT (BMI 25.0-29.9): ICD-10-CM

## 2025-03-31 DIAGNOSIS — I10 ESSENTIAL HYPERTENSION: ICD-10-CM

## 2025-03-31 LAB
EXPIRATION DATE: ABNORMAL
EXPIRATION DATE: NORMAL
HBA1C MFR BLD: 7.6 % (ref 4.5–5.7)
Lab: ABNORMAL
Lab: NORMAL
POC ALBUMIN, URINE: 80 MG/L
POC CREATININE, URINE: 100 MG/DL
POC URINE ALB/CREA RATIO: NORMAL

## 2025-03-31 NOTE — PROGRESS NOTES
Ulices Negron is a 55 y.o. male.    Chief Complaint   Patient presents with    Diabetes     Would like to see a dietitian     Hypertension    Hyperlipidemia       HPI   History of Present Illness  The patient presents for follow-up on diabetes, hypertension, and hyperlipidemia.    He reports a 10-pound weight gain despite no dietary changes. He started allergy medications 3 months ago. He is concerned the medication may have contributed to weight gain.     He had completed 3 weeks of PT for left shoulder pain. Physical therapy is beneficial, but he has difficulty lifting his arm, disrupting sleep. He experiences gastrointestinal discomfort when lying on his right side, relieved by pillows. Severe pain occurs with arm movement.     He has had memory issues and stopped his cholesterol medication, suspecting it as the cause, with significant memory improvement. He occasionally experiences brief chest pain and shortness of breath. He reports diarrhea and blood in his stool, attributed to a known hemorrhoid, and uses mesalamine enemas as needed. No cough, congestion, or runny nose. No temperature sensitivity or urinary issues. He has a persistent, dark bruise on his foot, not painful. No concerns with anxiety or depression.    He admits to not following a diabetic diet.  Compliant with jardiance and januvia.      BP is controlled in office today.  Compliant with losartan.     The following portions of the patient's history were reviewed and updated as appropriate: allergies, current medications, past family history, past medical history, past social history, past surgical history and problem list.     Allergies   Allergen Reactions    Statins Other (See Comments)     Memory         Current Outpatient Medications:     Blood Glucose Monitoring Suppl (ONE TOUCH ULTRA 2) w/Device kit, USE DAILY, Disp: 1 each, Rfl: 0    busPIRone (BUSPAR) 5 MG tablet, Take 1 tablet by mouth 2 (Two) Times a Day As Needed (anxiety).,  Disp: 180 tablet, Rfl: 3    cetirizine (zyrTEC) 10 MG tablet, Take 1 tablet by mouth Daily., Disp: , Rfl:     empagliflozin (Jardiance) 25 MG tablet tablet, Take 1 tablet by mouth Daily., Disp: 90 tablet, Rfl: 3    glucose blood test strip, Use as instructed; E.11.9, Disp: 100 each, Rfl: 5    losartan (COZAAR) 50 MG tablet, Take 1 tablet by mouth Daily., Disp: 90 tablet, Rfl: 3    meclizine (ANTIVERT) 25 MG tablet, Take 1 tablet by mouth 3 (Three) Times a Day As Needed for Dizziness., Disp: 30 tablet, Rfl: 0    meloxicam (Mobic) 7.5 MG tablet, Take 1 tablet by mouth Daily., Disp: 30 tablet, Rfl: 0    mesalamine (ROWASA) 4 g enema, Insert 1 enema into the rectum Every Night., Disp: 30 enema, Rfl: 5    methocarbamol (ROBAXIN) 500 MG tablet, Take 1 tablet by mouth 4 (Four) Times a Day., Disp: 120 tablet, Rfl: 2    montelukast (Singulair) 10 MG tablet, Take 1 tablet by mouth Every Night., Disp: 90 tablet, Rfl: 3    omeprazole (priLOSEC) 40 MG capsule, TAKE 1 CAPSULE BY MOUTH DAILY, Disp: 90 capsule, Rfl: 3    ONE TOUCH CLUB LANCETS misc, 1 each Daily. E11.9, Disp: 100 each, Rfl: 5    SITagliptin (Januvia) 100 MG tablet, TAKE 1 TABLET BY MOUTH DAILY; NEED AN APPOINTMENT FOR FURTHER REFILLS, Disp: 90 tablet, Rfl: 3    ROS    Review of Systems   Constitutional:  Negative for chills and fever.   HENT:  Positive for congestion and rhinorrhea.    Eyes:  Negative for visual disturbance.   Respiratory:  Negative for cough and shortness of breath.    Cardiovascular:  Positive for chest pain (twinges).   Gastrointestinal:  Positive for blood in stool and diarrhea. Negative for abdominal pain, constipation, nausea and vomiting.   Endocrine: Negative for cold intolerance and heat intolerance.   Genitourinary:  Negative for difficulty urinating.   Musculoskeletal:  Positive for arthralgias (left shoulder). Negative for back pain.   Skin:  Negative for rash.   Allergic/Immunologic: Negative for environmental allergies.  "  Neurological:  Negative for headache.   Hematological:  Bruises/bleeds easily.   Psychiatric/Behavioral:  Negative for depressed mood. The patient is not nervous/anxious.        Vitals:    03/31/25 0846   BP: 128/86   Pulse: 74   Temp: 98 °F (36.7 °C)   SpO2: 99%   Weight: 81.2 kg (179 lb)   Height: 167.6 cm (65.98\")   PainSc: 4          Physical Exam     Physical Exam  Constitutional:       General: He is not in acute distress.     Appearance: Normal appearance. He is well-developed.   HENT:      Head: Normocephalic and atraumatic.      Right Ear: Tympanic membrane and external ear normal.      Left Ear: Tympanic membrane and external ear normal.      Mouth/Throat:      Pharynx: No posterior oropharyngeal erythema.   Eyes:      Extraocular Movements: Extraocular movements intact.      Conjunctiva/sclera: Conjunctivae normal.      Pupils: Pupils are equal, round, and reactive to light.   Cardiovascular:      Rate and Rhythm: Normal rate and regular rhythm.      Heart sounds: No murmur heard.  Pulmonary:      Effort: Pulmonary effort is normal. No respiratory distress.      Breath sounds: Normal breath sounds. No wheezing.   Abdominal:      General: Bowel sounds are normal. There is no distension.      Palpations: Abdomen is soft.      Tenderness: There is no abdominal tenderness.   Musculoskeletal:      Cervical back: Neck supple.      Right lower leg: No edema.      Left lower leg: No edema.   Lymphadenopathy:      Cervical: No cervical adenopathy.   Skin:     General: Skin is warm and dry.   Neurological:      Mental Status: He is alert and oriented to person, place, and time.      Cranial Nerves: No cranial nerve deficit.      Deep Tendon Reflexes: Reflexes normal.   Psychiatric:         Mood and Affect: Mood normal.         Behavior: Behavior normal.             Diagnoses and all orders for this visit:    1. Type 2 diabetes mellitus without complication, without long-term current use of insulin (Primary)  -    "  POC Glycosylated Hemoglobin (Hb A1C)  -     POC Albumin/Creatinine Ratio Urine  -     Ambulatory Referral to Nutrition Services    2. Overweight (BMI 25.0-29.9)    3. Acute pain of left shoulder    4. Essential hypertension  -     CBC & Differential  -     Comprehensive Metabolic Panel    5. Hyperlipidemia, unspecified hyperlipidemia type  -     Lipid Panel        Assessment & Plan  1. Diabetes Mellitus.  Uncontrolled, A1c 7.6 (previously 6.8). Continue Jardiance and Januvia. Referral to dietitian for dietary modifications and weight management.    2. Overweight.  Referral to dietitian. Advised to follow a healthy, low-carbohydrate diet and regular aerobic activity.    3. Acute left shoulder pain.  Continue physical therapy. Consider orthopedic referral if no improvement after 6 weeks.    4. Hypertension.  Well-managed with losartan. Continue current regimen.    5. Hyperlipidemia.  Uncontrolled, possibly due to statin noncompliance. Statin remains discontinued due to suspected side effects. Obtain updated lipid panel.        No orders of the defined types were placed in this encounter.      No orders of the defined types were placed in this encounter.      Return in about 3 months (around 6/30/2025) for diabetes.    Carmen Benitez DO

## 2025-04-01 LAB
ALBUMIN SERPL-MCNC: 4.2 G/DL (ref 3.5–5.2)
ALBUMIN/GLOB SERPL: 1.6 G/DL
ALP SERPL-CCNC: 158 U/L (ref 39–117)
ALT SERPL-CCNC: 25 U/L (ref 1–41)
AST SERPL-CCNC: 21 U/L (ref 1–40)
BASOPHILS # BLD AUTO: 0.08 10*3/MM3 (ref 0–0.2)
BASOPHILS NFR BLD AUTO: 0.9 % (ref 0–1.5)
BILIRUB SERPL-MCNC: 0.5 MG/DL (ref 0–1.2)
BUN SERPL-MCNC: 22 MG/DL (ref 6–20)
BUN/CREAT SERPL: 22.2 (ref 7–25)
CALCIUM SERPL-MCNC: 9 MG/DL (ref 8.6–10.5)
CHLORIDE SERPL-SCNC: 104 MMOL/L (ref 98–107)
CHOLEST SERPL-MCNC: 210 MG/DL (ref 0–200)
CO2 SERPL-SCNC: 27 MMOL/L (ref 22–29)
CREAT SERPL-MCNC: 0.99 MG/DL (ref 0.76–1.27)
EGFRCR SERPLBLD CKD-EPI 2021: 91.1 ML/MIN/1.73
EOSINOPHIL # BLD AUTO: 0.52 10*3/MM3 (ref 0–0.4)
EOSINOPHIL NFR BLD AUTO: 6 % (ref 0.3–6.2)
ERYTHROCYTE [DISTWIDTH] IN BLOOD BY AUTOMATED COUNT: 12.8 % (ref 12.3–15.4)
GLOBULIN SER CALC-MCNC: 2.6 GM/DL
GLUCOSE SERPL-MCNC: 142 MG/DL (ref 65–99)
HCT VFR BLD AUTO: 48.1 % (ref 37.5–51)
HDLC SERPL-MCNC: 47 MG/DL (ref 40–60)
HGB BLD-MCNC: 16.5 G/DL (ref 13–17.7)
IMM GRANULOCYTES # BLD AUTO: 0.02 10*3/MM3 (ref 0–0.05)
IMM GRANULOCYTES NFR BLD AUTO: 0.2 % (ref 0–0.5)
LDLC SERPL CALC-MCNC: 150 MG/DL (ref 0–100)
LYMPHOCYTES # BLD AUTO: 2.2 10*3/MM3 (ref 0.7–3.1)
LYMPHOCYTES NFR BLD AUTO: 25.3 % (ref 19.6–45.3)
MCH RBC QN AUTO: 31 PG (ref 26.6–33)
MCHC RBC AUTO-ENTMCNC: 34.3 G/DL (ref 31.5–35.7)
MCV RBC AUTO: 90.2 FL (ref 79–97)
MONOCYTES # BLD AUTO: 0.69 10*3/MM3 (ref 0.1–0.9)
MONOCYTES NFR BLD AUTO: 7.9 % (ref 5–12)
NEUTROPHILS # BLD AUTO: 5.17 10*3/MM3 (ref 1.7–7)
NEUTROPHILS NFR BLD AUTO: 59.7 % (ref 42.7–76)
NRBC BLD AUTO-RTO: 0 /100 WBC (ref 0–0.2)
PLATELET # BLD AUTO: 339 10*3/MM3 (ref 140–450)
POTASSIUM SERPL-SCNC: 4.4 MMOL/L (ref 3.5–5.2)
PROT SERPL-MCNC: 6.8 G/DL (ref 6–8.5)
RBC # BLD AUTO: 5.33 10*6/MM3 (ref 4.14–5.8)
SODIUM SERPL-SCNC: 141 MMOL/L (ref 136–145)
TRIGL SERPL-MCNC: 74 MG/DL (ref 0–150)
VLDLC SERPL CALC-MCNC: 13 MG/DL (ref 5–40)
WBC # BLD AUTO: 8.68 10*3/MM3 (ref 3.4–10.8)

## 2025-04-10 ENCOUNTER — TELEPHONE (OUTPATIENT)
Dept: INTERNAL MEDICINE | Facility: CLINIC | Age: 55
End: 2025-04-10
Payer: COMMERCIAL

## 2025-04-10 DIAGNOSIS — M25.512 ACUTE PAIN OF LEFT SHOULDER: Primary | ICD-10-CM

## 2025-04-10 NOTE — TELEPHONE ENCOUNTER
Caller: Ulices Negron    Relationship: Self    Best call back number: [phone]     What was the call regarding: PATIENT STATED THAT HE HAS BEEN DOING PHYSICAL THERAPY FOR 5 WEEKS AND THERE IS LITTLE TO NO IMPROVEMENT. THE PHYSICAL THERAPIST STATED THAT HIS RECOMMENDATION IS TO BE SEEN WITH ORTHO AND GET SOME MRI OR WHATEVER NEXT STEP NEEDS TO BE. PLEASE CALL AND ADVISE.

## 2025-04-17 ENCOUNTER — OFFICE VISIT (OUTPATIENT)
Dept: INTERNAL MEDICINE | Facility: CLINIC | Age: 55
End: 2025-04-17
Payer: COMMERCIAL

## 2025-04-17 VITALS
HEIGHT: 66 IN | DIASTOLIC BLOOD PRESSURE: 80 MMHG | OXYGEN SATURATION: 98 % | WEIGHT: 180 LBS | HEART RATE: 78 BPM | SYSTOLIC BLOOD PRESSURE: 122 MMHG | BODY MASS INDEX: 28.93 KG/M2 | TEMPERATURE: 98 F

## 2025-04-17 DIAGNOSIS — R09.81 NASAL CONGESTION: ICD-10-CM

## 2025-04-17 DIAGNOSIS — R05.1 ACUTE COUGH: ICD-10-CM

## 2025-04-17 DIAGNOSIS — J01.90 ACUTE NON-RECURRENT SINUSITIS, UNSPECIFIED LOCATION: Primary | ICD-10-CM

## 2025-04-17 DIAGNOSIS — J02.9 SORE THROAT: ICD-10-CM

## 2025-04-17 LAB
EXPIRATION DATE: NORMAL
EXPIRATION DATE: NORMAL
FLUAV AG UPPER RESP QL IA.RAPID: NOT DETECTED
FLUBV AG UPPER RESP QL IA.RAPID: NOT DETECTED
INTERNAL CONTROL: NORMAL
INTERNAL CONTROL: NORMAL
Lab: NORMAL
Lab: NORMAL
S PYO AG THROAT QL: NEGATIVE
SARS-COV-2 AG UPPER RESP QL IA.RAPID: NOT DETECTED

## 2025-04-17 PROCEDURE — 87880 STREP A ASSAY W/OPTIC: CPT | Performed by: FAMILY MEDICINE

## 2025-04-17 PROCEDURE — 87428 SARSCOV & INF VIR A&B AG IA: CPT | Performed by: FAMILY MEDICINE

## 2025-04-17 PROCEDURE — 99213 OFFICE O/P EST LOW 20 MIN: CPT | Performed by: FAMILY MEDICINE

## 2025-04-17 RX ORDER — CEFDINIR 300 MG/1
300 CAPSULE ORAL 2 TIMES DAILY
Qty: 20 CAPSULE | Refills: 0 | Status: SHIPPED | OUTPATIENT
Start: 2025-04-17

## 2025-04-17 NOTE — PROGRESS NOTES
Ulices Negron is a 55 y.o. male.    Chief Complaint   Patient presents with    Cough     On going for 3 days.    Sore Throat    Nasal Congestion       HPI   History of Present Illness  The patient presents with sinus issues.    Experiencing nasal congestion, postnasal drip, sore throat, and rhinorrhea for 3 days. Reports dryness from nose to chest when lying down, productive cough, but no headaches or fevers. Primary concern is excessive drainage. Using prescribed nasal spray, plans to continue treatment as previously advised. No abdominal tenderness, nausea, or vomiting.       The following portions of the patient's history were reviewed and updated as appropriate: allergies, current medications, past family history, past medical history, past social history, past surgical history and problem list.     Allergies   Allergen Reactions    Statins Other (See Comments)     Memory         Current Outpatient Medications:     Blood Glucose Monitoring Suppl (ONE TOUCH ULTRA 2) w/Device kit, USE DAILY, Disp: 1 each, Rfl: 0    busPIRone (BUSPAR) 5 MG tablet, Take 1 tablet by mouth 2 (Two) Times a Day As Needed (anxiety)., Disp: 180 tablet, Rfl: 3    cetirizine (zyrTEC) 10 MG tablet, Take 1 tablet by mouth Daily., Disp: , Rfl:     empagliflozin (Jardiance) 25 MG tablet tablet, Take 1 tablet by mouth Daily., Disp: 90 tablet, Rfl: 3    ezetimibe (Zetia) 10 MG tablet, Take 1 tablet by mouth Daily., Disp: 90 tablet, Rfl: 3    glucose blood test strip, Use as instructed; E.11.9, Disp: 100 each, Rfl: 5    losartan (COZAAR) 50 MG tablet, Take 1 tablet by mouth Daily., Disp: 90 tablet, Rfl: 3    meclizine (ANTIVERT) 25 MG tablet, Take 1 tablet by mouth 3 (Three) Times a Day As Needed for Dizziness., Disp: 30 tablet, Rfl: 0    meloxicam (Mobic) 7.5 MG tablet, Take 1 tablet by mouth Daily., Disp: 30 tablet, Rfl: 0    mesalamine (ROWASA) 4 g enema, Insert 1 enema into the rectum Every Night., Disp: 30 enema, Rfl: 5     "methocarbamol (ROBAXIN) 500 MG tablet, Take 1 tablet by mouth 4 (Four) Times a Day., Disp: 120 tablet, Rfl: 2    montelukast (Singulair) 10 MG tablet, Take 1 tablet by mouth Every Night., Disp: 90 tablet, Rfl: 3    omeprazole (priLOSEC) 40 MG capsule, TAKE 1 CAPSULE BY MOUTH DAILY, Disp: 90 capsule, Rfl: 3    ONE TOUCH CLUB LANCETS misc, 1 each Daily. E11.9, Disp: 100 each, Rfl: 5    SITagliptin (Januvia) 100 MG tablet, TAKE 1 TABLET BY MOUTH DAILY; NEED AN APPOINTMENT FOR FURTHER REFILLS, Disp: 90 tablet, Rfl: 3    cefdinir (OMNICEF) 300 MG capsule, Take 1 capsule by mouth 2 (Two) Times a Day., Disp: 20 capsule, Rfl: 0    ROS    Review of Systems   Constitutional:  Negative for chills and fever.   HENT:  Positive for congestion, postnasal drip and sore throat.    Respiratory:  Positive for cough.        Vitals:    04/17/25 1607   BP: 122/80   Pulse: 78   Temp: 98 °F (36.7 °C)   SpO2: 98%   Weight: 81.6 kg (180 lb)   Height: 167.6 cm (65.98\")         Physical Exam     Physical Exam  Constitutional:       General: He is not in acute distress.     Appearance: Normal appearance. He is well-developed.   HENT:      Head: Normocephalic and atraumatic.      Right Ear: External ear normal. A middle ear effusion is present.      Left Ear: Tympanic membrane and external ear normal.      Mouth/Throat:      Pharynx: Posterior oropharyngeal erythema and uvula swelling present.   Eyes:      Extraocular Movements: Extraocular movements intact.      Conjunctiva/sclera: Conjunctivae normal.   Cardiovascular:      Rate and Rhythm: Normal rate and regular rhythm.      Heart sounds: No murmur heard.  Pulmonary:      Effort: Pulmonary effort is normal. No respiratory distress.      Breath sounds: Normal breath sounds. No wheezing.   Abdominal:      General: Bowel sounds are normal. There is no distension.      Palpations: Abdomen is soft.      Tenderness: There is no abdominal tenderness.   Musculoskeletal:      Cervical back: Neck " supple.      Right lower leg: No edema.      Left lower leg: No edema.   Lymphadenopathy:      Cervical: No cervical adenopathy.   Skin:     General: Skin is warm and dry.   Neurological:      Mental Status: He is alert and oriented to person, place, and time.      Cranial Nerves: No cranial nerve deficit.   Psychiatric:         Mood and Affect: Mood normal.         Behavior: Behavior normal.             Diagnoses and all orders for this visit:    1. Acute non-recurrent sinusitis, unspecified location (Primary)    2. Acute cough  -     POCT SARS-CoV-2 Antigen JOAN + Flu  -     POCT rapid strep A    3. Nasal congestion  -     POCT SARS-CoV-2 Antigen JOAN + Flu  -     POCT rapid strep A    4. Sore throat  -     POCT SARS-CoV-2 Antigen JOAN + Flu  -     POCT rapid strep A    Other orders  -     cefdinir (OMNICEF) 300 MG capsule; Take 1 capsule by mouth 2 (Two) Times a Day.  Dispense: 20 capsule; Refill: 0        Assessment & Plan  1. Sinusitis.  - Prescribed Cefdinir 300 mg BID for 10 days for bacterial infection.  - Continue nasal spray as directed by ENT. Recommend humidifier for nighttime dryness.      New Medications Ordered This Visit   Medications    cefdinir (OMNICEF) 300 MG capsule     Sig: Take 1 capsule by mouth 2 (Two) Times a Day.     Dispense:  20 capsule     Refill:  0       No orders of the defined types were placed in this encounter.      Return if symptoms worsen or fail to improve.    Carmen Benitez,

## 2025-04-23 ENCOUNTER — TELEPHONE (OUTPATIENT)
Dept: INTERNAL MEDICINE | Facility: CLINIC | Age: 55
End: 2025-04-23

## 2025-04-23 NOTE — TELEPHONE ENCOUNTER
Caller: Ulices Negron    Relationship: Self    Best call back number: [phone]     What was the call regarding: PATIENT STATED THAT HE  IS NEEDING TO COME AND GET HIS XRAYS ON A DISK SO HE CAN GET THIS TO HIS ORTHOPEDIC. PLEASE CALL AND ADVISE WHAT HE IS NEEDING TO DO TO GET THAT.

## 2025-04-29 ENCOUNTER — TELEPHONE (OUTPATIENT)
Dept: INTERNAL MEDICINE | Facility: CLINIC | Age: 55
End: 2025-04-29
Payer: COMMERCIAL

## 2025-04-29 NOTE — TELEPHONE ENCOUNTER
Caller: Ulices Negron    Relationship: Self    Best call back number: [phone]     What form or medical record are you requesting: INTERMITTENT [facility] PAPERWORK    Who is requesting this form or medical record from you: WORK: [employer]    How would you like to receive the form or medical records (pick-up, mail, fax): FAX TO WORK  If fax, what is the fax number: [phone]    Timeframe paperwork needed: 05/13/25    Additional notes: PATIENT WILL DROP OFF THE PAPERWORK TODAY, 04/29/25. PLEASE LOCATE, COMPLETE AND FAX BACK TO WORK.     PATIENT WILL BE DOING PHYSICAL THERAPY TWICE PER WEEK AND CORTISONE SHOTS ONCE EVERY THREE MONTHS. THE LA WILL BE TO ALLOW HIM TO GO TO THESE APPOINTMENTS.      START DATE IS 04/28/25 AND THE ENDING DATE FOR AT LEAST THE FIRST 6 MONTHS TO MAKE SURE THIS IS COVERED FOR ITS ENTIRETY.

## 2025-04-30 NOTE — TELEPHONE ENCOUNTER
Name: Ulices Negron      Relationship: Self      Best Callback Number: [phone]       HUB PROVIDED THE RELAY MESSAGE FROM THE OFFICE      PATIENT: VOICED UNDERSTANDING AND HAS NO FURTHER QUESTIONS AT THIS TIME    ADDITIONAL INFORMATION:IT IS FOR PHYSICAL THERAPY BECAUSE ORTHO TOLD HIM TO CONTINUE PHYSICAL THERAPY SO IT IS CONTINUED PHYSICAL THERAPY FOR AT 6 MONTHS,PATIENT ASKS TO LET HIM KNOW IF HE NEEDS TO TAKE IT TO ORTHO

## 2025-04-30 NOTE — TELEPHONE ENCOUNTER
"Relay     \"I have the FMLA forms. However on 04/10/2025 patient reported to PT that he was going to hold visits until seeing Ortho. I left a vm asking patient to call back. I need to know if he is going to need FMLA for the pt Dr. Benitez ordered.  If the FMLA is just for his ortho appointments, ortho would have to fill it out, as they are the ones seeing him for that treatment. \"      "

## 2025-05-05 NOTE — TELEPHONE ENCOUNTER
Sanjiv told Ulices he could continue PT that was ordered by Dr. Benitez.   Will be continuing with [facility] pt. Has an appointment on 05/06/2025 ,2 days a week.     Are you okay with completing the FMLA for him?

## 2025-06-16 RX ORDER — EMPAGLIFLOZIN 25 MG/1
25 TABLET, FILM COATED ORAL DAILY
Qty: 90 TABLET | Refills: 3 | Status: SHIPPED | OUTPATIENT
Start: 2025-06-16

## 2025-06-16 NOTE — TELEPHONE ENCOUNTER
Rx Refill Note  Requested Prescriptions     Pending Prescriptions Disp Refills    empagliflozin (Jardiance) 25 MG tablet tablet [Pharmacy Med Name: JARDIANCE 25 MG TABLET] 90 tablet 3     Sig: TAKE 1 TABLET BY MOUTH DAILY      Last office visit with prescribing clinician: 4/17/2025   Last telemedicine visit with prescribing clinician: Visit date not found   Next office visit with prescribing clinician: 6/30/2025       Bonnie Hernandez MA  06/16/25, 12:39 EDT

## 2025-06-30 ENCOUNTER — OFFICE VISIT (OUTPATIENT)
Dept: INTERNAL MEDICINE | Facility: CLINIC | Age: 55
End: 2025-06-30
Payer: COMMERCIAL

## 2025-06-30 VITALS
OXYGEN SATURATION: 98 % | HEART RATE: 78 BPM | WEIGHT: 174 LBS | DIASTOLIC BLOOD PRESSURE: 78 MMHG | HEIGHT: 66 IN | TEMPERATURE: 98 F | BODY MASS INDEX: 27.97 KG/M2 | SYSTOLIC BLOOD PRESSURE: 120 MMHG

## 2025-06-30 DIAGNOSIS — I86.1 SCROTAL VARICOSE VEINS: ICD-10-CM

## 2025-06-30 DIAGNOSIS — J30.9 ALLERGIC RHINITIS, UNSPECIFIED SEASONALITY, UNSPECIFIED TRIGGER: ICD-10-CM

## 2025-06-30 DIAGNOSIS — E78.5 HYPERLIPIDEMIA, UNSPECIFIED HYPERLIPIDEMIA TYPE: ICD-10-CM

## 2025-06-30 DIAGNOSIS — K21.9 GASTROESOPHAGEAL REFLUX DISEASE, UNSPECIFIED WHETHER ESOPHAGITIS PRESENT: ICD-10-CM

## 2025-06-30 DIAGNOSIS — E11.9 TYPE 2 DIABETES MELLITUS WITHOUT COMPLICATION, WITHOUT LONG-TERM CURRENT USE OF INSULIN: Primary | ICD-10-CM

## 2025-06-30 PROBLEM — G89.29 CHRONIC LEFT SHOULDER PAIN: Status: ACTIVE | Noted: 2025-03-31

## 2025-06-30 PROBLEM — J01.90 ACUTE NON-RECURRENT SINUSITIS: Status: RESOLVED | Noted: 2023-09-06 | Resolved: 2025-06-30

## 2025-06-30 LAB
EXPIRATION DATE: ABNORMAL
HBA1C MFR BLD: 7.1 % (ref 4.5–5.7)
Lab: ABNORMAL

## 2025-06-30 PROCEDURE — 83036 HEMOGLOBIN GLYCOSYLATED A1C: CPT | Performed by: FAMILY MEDICINE

## 2025-06-30 PROCEDURE — 99214 OFFICE O/P EST MOD 30 MIN: CPT | Performed by: FAMILY MEDICINE

## 2025-06-30 NOTE — PROGRESS NOTES
Ulices Negron is a 55 y.o. male.    Chief Complaint   Patient presents with    Diabetes       HPI   History of Present Illness  The patient presents for follow-up on diabetes and hyperlipidemia.    He manages his diet well, avoiding excessive sugar and carbohydrates. He avoids red meat due to digestive issues post-cholecystectomy. Not checking glucose regularly.  Taking januvia and jardiance without any side effects.     Recently, he experienced severe reflux after consuming hot dogs, leading to hemoptysis. He takes Prilosec daily, which generally manages his reflux unless he consumes greasy foods. He avoids milk and ice cream due to intolerance but can consume cheese without issue. He reports no chest pain, dyspnea, nausea, diarrhea, constipation, abdominal pain, or leg swelling.     He noticed some orange discoloration on his legs, attributed to his boots, and a persistent bruise on the bottom of his foot, concerning due to his diabetes.    He tolerates Zetia well for hyperlipidemia, with no reported myalgia or weakness. He has noticed improved memory since starting the medication.    He experiences shoulder pain, not alleviated by a recent cortisone injection. He has a follow-up with Orthopedics in 2 months and continues physical therapy twice a week, which has been beneficial in strengthening his arms, though certain positions remain challenging.    His sinus infections have improved following antibiotics. He continues montelukast at night, which helps manage his allergies.    He noticed a black vein around his scrotum, causing concern.  He does report occasional discomfort.        The following portions of the patient's history were reviewed and updated as appropriate: allergies, current medications, past family history, past medical history, past social history, past surgical history and problem list.     Allergies   Allergen Reactions    Statins Other (See Comments)     Memory         Current  Outpatient Medications:     Blood Glucose Monitoring Suppl (ONE TOUCH ULTRA 2) w/Device kit, USE DAILY, Disp: 1 each, Rfl: 0    busPIRone (BUSPAR) 5 MG tablet, Take 1 tablet by mouth 2 (Two) Times a Day As Needed (anxiety)., Disp: 180 tablet, Rfl: 3    cetirizine (zyrTEC) 10 MG tablet, Take 1 tablet by mouth Daily., Disp: , Rfl:     empagliflozin (Jardiance) 25 MG tablet tablet, TAKE 1 TABLET BY MOUTH DAILY, Disp: 90 tablet, Rfl: 3    ezetimibe (Zetia) 10 MG tablet, Take 1 tablet by mouth Daily., Disp: 90 tablet, Rfl: 3    glucose blood test strip, Use as instructed; E.11.9, Disp: 100 each, Rfl: 5    losartan (COZAAR) 50 MG tablet, Take 1 tablet by mouth Daily., Disp: 90 tablet, Rfl: 3    meclizine (ANTIVERT) 25 MG tablet, Take 1 tablet by mouth 3 (Three) Times a Day As Needed for Dizziness., Disp: 30 tablet, Rfl: 0    meloxicam (Mobic) 7.5 MG tablet, Take 1 tablet by mouth Daily., Disp: 30 tablet, Rfl: 0    mesalamine (ROWASA) 4 g enema, Insert 1 enema into the rectum Every Night., Disp: 30 enema, Rfl: 5    methocarbamol (ROBAXIN) 500 MG tablet, Take 1 tablet by mouth 4 (Four) Times a Day., Disp: 120 tablet, Rfl: 2    montelukast (Singulair) 10 MG tablet, Take 1 tablet by mouth Every Night., Disp: 90 tablet, Rfl: 3    omeprazole (priLOSEC) 40 MG capsule, TAKE 1 CAPSULE BY MOUTH DAILY, Disp: 90 capsule, Rfl: 3    ONE TOUCH CLUB LANCETS misc, 1 each Daily. E11.9, Disp: 100 each, Rfl: 5    SITagliptin (Januvia) 100 MG tablet, TAKE 1 TABLET BY MOUTH DAILY; NEED AN APPOINTMENT FOR FURTHER REFILLS, Disp: 90 tablet, Rfl: 3    ROS    Review of Systems   Constitutional:  Negative for chills and fever.   Respiratory:  Negative for cough and shortness of breath.    Cardiovascular:  Negative for chest pain.   Gastrointestinal:  Positive for GERD. Negative for constipation, diarrhea and nausea.   Musculoskeletal:  Positive for arthralgias (left shoulder pain).       Vitals:    06/30/25 0840   BP: 120/78   Pulse: 78   Temp: 98  "°F (36.7 °C)   SpO2: 98%   Weight: 78.9 kg (174 lb)   Height: 167.6 cm (65.98\")   PainSc: 0-No pain         Physical Exam     Physical Exam  Constitutional:       General: He is not in acute distress.     Appearance: Normal appearance. He is well-developed.   HENT:      Head: Normocephalic and atraumatic.      Right Ear: External ear normal.      Left Ear: External ear normal.   Eyes:      Extraocular Movements: Extraocular movements intact.      Conjunctiva/sclera: Conjunctivae normal.   Cardiovascular:      Rate and Rhythm: Normal rate and regular rhythm.      Heart sounds: No murmur heard.  Pulmonary:      Effort: Pulmonary effort is normal. No respiratory distress.      Breath sounds: Normal breath sounds. No wheezing.   Abdominal:      General: Bowel sounds are normal. There is no distension.      Palpations: Abdomen is soft.      Tenderness: There is no abdominal tenderness.   Musculoskeletal:      Right lower leg: No edema.      Left lower leg: No edema.   Skin:     General: Skin is warm and dry.      Comments: Chronic broken capillaries to left foot.   Neurological:      Mental Status: He is alert and oriented to person, place, and time.      Cranial Nerves: No cranial nerve deficit.      Deep Tendon Reflexes: Reflexes normal.   Psychiatric:         Mood and Affect: Mood normal.         Behavior: Behavior normal.           Diagnoses and all orders for this visit:    1. Type 2 diabetes mellitus without complication, without long-term current use of insulin (Primary)  -     POC Glycosylated Hemoglobin (Hb A1C)    2. Hyperlipidemia, unspecified hyperlipidemia type    3. Allergic rhinitis, unspecified seasonality, unspecified trigger    4. Gastroesophageal reflux disease, unspecified whether esophagitis present    5. Scrotal varicose veins  -     Ambulatory Referral to Urology        Assessment & Plan  1. Diabetes Mellitus.  - Improved with A1c of 7.1, down from 7.6.  - Continue Jardiance and Januvia.    2. " Hyperlipidemia.  - Remains uncontrolled per last labs.  - No myalgia or weakness; Zetia well tolerated.  - Re-evaluation next visit.    3. Allergic Rhinitis.  - Stable on current medications.  - Continue antihistamine and montelukast.    4. Gastroesophageal Reflux Disease (GERD).  - Improved with Prilosec; occasional flare-ups with certain foods.  - Continue Prilosec daily.    5. Scrotal Varicosity.  - Noted black vein around scrotum; concerning to patient.  - Referral to Urology for evaluation.      No orders of the defined types were placed in this encounter.      No orders of the defined types were placed in this encounter.      Return in about 3 months (around 9/30/2025) for diabetes, HPL, HTN.    Carmen Benitez, DO

## 2025-07-21 NOTE — PROGRESS NOTES
Patient: Ulices Negron    YOB: 1970    Date: 07/23/2025    Primary Care Provider: Carmen Benitez DO    Chief Complaint   Patient presents with    Heartburn       SUBJECTIVE:    History of present illness:  The patient is in the office today for evaluation and treatment of reflux.  Patient has severe esophagitis or reflux with aspiration at night.  At times difficulty swallowing.  Patient seen by ENT and found to have significant erythema and inflammation at the epiglottis.    The following portions of the patient's history were reviewed and updated as appropriate: allergies, current medications, past family history, past medical history, past social history, past surgical history and problem list.      Review of Systems   Constitutional:  Negative for chills, fever and unexpected weight change.   HENT:  Negative for trouble swallowing and voice change.    Eyes:  Negative for visual disturbance.   Respiratory:  Negative for apnea, cough, chest tightness, shortness of breath and wheezing.    Cardiovascular:  Negative for chest pain, palpitations and leg swelling.   Gastrointestinal:  Negative for abdominal distention, abdominal pain, anal bleeding, blood in stool, constipation, diarrhea, nausea, rectal pain and vomiting.   Endocrine: Negative for cold intolerance and heat intolerance.   Genitourinary:  Negative for difficulty urinating, dysuria, flank pain, scrotal swelling and testicular pain.   Musculoskeletal:  Negative for back pain, gait problem and joint swelling.   Skin:  Negative for color change, rash and wound.   Neurological:  Negative for dizziness, syncope, speech difficulty, weakness, numbness and headaches.   Hematological:  Negative for adenopathy. Does not bruise/bleed easily.   Psychiatric/Behavioral:  Negative for confusion. The patient is not nervous/anxious.          Allergies:  Allergies   Allergen Reactions    Statins Other (See Comments)     Memory        Medications:    Current Outpatient Medications:     Blood Glucose Monitoring Suppl (ONE TOUCH ULTRA 2) w/Device kit, USE DAILY, Disp: 1 each, Rfl: 0    busPIRone (BUSPAR) 5 MG tablet, Take 1 tablet by mouth 2 (Two) Times a Day As Needed (anxiety)., Disp: 180 tablet, Rfl: 3    cetirizine (zyrTEC) 10 MG tablet, Take 1 tablet by mouth Daily., Disp: , Rfl:     empagliflozin (Jardiance) 25 MG tablet tablet, TAKE 1 TABLET BY MOUTH DAILY, Disp: 90 tablet, Rfl: 3    ezetimibe (Zetia) 10 MG tablet, Take 1 tablet by mouth Daily., Disp: 90 tablet, Rfl: 3    glucose blood test strip, Use as instructed; E.11.9, Disp: 100 each, Rfl: 5    losartan (COZAAR) 50 MG tablet, Take 1 tablet by mouth Daily., Disp: 90 tablet, Rfl: 3    meclizine (ANTIVERT) 25 MG tablet, Take 1 tablet by mouth 3 (Three) Times a Day As Needed for Dizziness., Disp: 30 tablet, Rfl: 0    meloxicam (Mobic) 7.5 MG tablet, Take 1 tablet by mouth Daily., Disp: 30 tablet, Rfl: 0    mesalamine (ROWASA) 4 g enema, Insert 1 enema into the rectum Every Night., Disp: 30 enema, Rfl: 5    methocarbamol (ROBAXIN) 500 MG tablet, Take 1 tablet by mouth 4 (Four) Times a Day., Disp: 120 tablet, Rfl: 2    montelukast (Singulair) 10 MG tablet, Take 1 tablet by mouth Every Night., Disp: 90 tablet, Rfl: 3    omeprazole (priLOSEC) 40 MG capsule, TAKE 1 CAPSULE BY MOUTH DAILY, Disp: 90 capsule, Rfl: 3    ONE TOUCH CLUB LANCETS misc, 1 each Daily. E11.9, Disp: 100 each, Rfl: 5    SITagliptin (Januvia) 100 MG tablet, TAKE 1 TABLET BY MOUTH DAILY; NEED AN APPOINTMENT FOR FURTHER REFILLS, Disp: 90 tablet, Rfl: 3    History:  Past Medical History:   Diagnosis Date    Allergic     Seasonal    Anxiety     Bisiprone    Cholelithiasis     Gall bladder removed    Diabetes mellitus     Elevated cholesterol     HL (hearing loss)     Hyperlipidemia     Hypertension     Irritable bowel syndrome        Past Surgical History:   Procedure Laterality Date    BACK SURGERY      CHOLECYSTECTOMY    "   COLONOSCOPY      2017-Dr Rosales    COLONOSCOPY N/A 2020    Procedure: COLONOSCOPY;  Surgeon: Karina Castillo MD;  Location[facility] ENDOSCOPY;  Service: Gastroenterology;  Laterality: N/A;    HERNIA REPAIR      SPINE SURGERY      Repair 2 bulging disks    VASECTOMY         Family History   Problem Relation Age of Onset    Heart attack Maternal Aunt     Heart disease Maternal Grandmother     Heart disease Paternal Grandfather        Social History     Tobacco Use    Smoking status: Former     Current packs/day: 0.00     Average packs/day: 0.3 packs/day for 26.6 years (7.0 ttl pk-yrs)     Types: Cigarettes     Start date: 1987     Quit date: 2007     Years since quittin.5     Passive exposure: Past    Smokeless tobacco: Never   Vaping Use    Vaping status: Never Used   Substance Use Topics    Alcohol use: Yes     Comment: rarely    Drug use: Never        OBJECTIVE:    Vital Signs:   Vitals:    25 1016   BP: 120/78   Pulse: 78   Temp: 98 °F (36.7 °C)   TempSrc: Infrared   SpO2: 96%   Weight: 80.3 kg (177 lb)   Height: 167.6 cm (65.98\")       Physical Exam:       Lungs-clear  Heart-regular rate  Abdomen-tender epigastric region, no guarding or rebound    Results Review:   I reviewed the patient's new clinical results.    Review of Systems was reviewed and confirmed as accurate as documented by the MA.    ASSESSMENT/PLAN:    1. Gastroesophageal reflux disease, unspecified whether esophagitis present    2. Epigastric pain        Patient is scheduled for an EGD with biopsy next week.  With the bleeding infection and perforation discussed and patient agreeable.  Continue Prilosec daily avoid caffeine alcohol and nicotine.  Recommend prescription strength Prilosec at 40 mg daily.  Patient may require lap Nissen fundoplication in future if we find significant findings on upper endoscopy.          Electronically signed by Fransisco Rosales MD  25          "

## 2025-07-23 ENCOUNTER — TELEPHONE (OUTPATIENT)
Dept: SURGERY | Facility: CLINIC | Age: 55
End: 2025-07-23

## 2025-07-23 ENCOUNTER — PATIENT ROUNDING (BHMG ONLY) (OUTPATIENT)
Dept: SURGERY | Facility: CLINIC | Age: 55
End: 2025-07-23
Payer: COMMERCIAL

## 2025-07-23 ENCOUNTER — OFFICE VISIT (OUTPATIENT)
Dept: SURGERY | Facility: CLINIC | Age: 55
End: 2025-07-23
Payer: COMMERCIAL

## 2025-07-23 VITALS
OXYGEN SATURATION: 96 % | TEMPERATURE: 98 F | BODY MASS INDEX: 28.45 KG/M2 | WEIGHT: 177 LBS | DIASTOLIC BLOOD PRESSURE: 78 MMHG | HEIGHT: 66 IN | SYSTOLIC BLOOD PRESSURE: 120 MMHG | HEART RATE: 78 BPM

## 2025-07-23 DIAGNOSIS — K21.9 GASTROESOPHAGEAL REFLUX DISEASE, UNSPECIFIED WHETHER ESOPHAGITIS PRESENT: Primary | ICD-10-CM

## 2025-07-23 DIAGNOSIS — R10.13 EPIGASTRIC PAIN: ICD-10-CM

## 2025-07-23 PROCEDURE — 99203 OFFICE O/P NEW LOW 30 MIN: CPT | Performed by: SURGERY

## 2025-07-23 NOTE — PROGRESS NOTES
July 23, 2025    Hello, may I speak with Ulices Negron?    My name is Dayan      I am  with [facility]  [address]  [facility] [facility] [address] [phone].    Before we get started may I verify your date of birth? 1970    I am calling to officially welcome you to our practice and ask about your recent visit. Is this a good time to talk? yes    Tell me about your visit with us. What things went well?  Everything was good, Dr. Rosales is great!       We're always looking for ways to make our patients' experiences even better. Do you have recommendations on ways we may improve?  no    Overall were you satisfied with your first visit to our practice? yes       I appreciate you taking the time to speak with me today. Is there anything else I can do for you? no      Thank you, and have a great day.

## 2025-07-29 ENCOUNTER — OUTSIDE FACILITY SERVICE (OUTPATIENT)
Dept: SURGERY | Facility: CLINIC | Age: 55
End: 2025-07-29
Payer: COMMERCIAL

## 2025-08-06 ENCOUNTER — OFFICE VISIT (OUTPATIENT)
Dept: SURGERY | Facility: CLINIC | Age: 55
End: 2025-08-06
Payer: COMMERCIAL

## 2025-08-06 VITALS
DIASTOLIC BLOOD PRESSURE: 72 MMHG | HEART RATE: 70 BPM | OXYGEN SATURATION: 98 % | BODY MASS INDEX: 28.28 KG/M2 | TEMPERATURE: 97.5 F | WEIGHT: 176 LBS | HEIGHT: 66 IN | SYSTOLIC BLOOD PRESSURE: 138 MMHG

## 2025-08-06 DIAGNOSIS — R10.10 PAIN OF UPPER ABDOMEN: ICD-10-CM

## 2025-08-06 DIAGNOSIS — K21.00 GASTROESOPHAGEAL REFLUX DISEASE WITH ESOPHAGITIS WITHOUT HEMORRHAGE: Primary | ICD-10-CM

## 2025-08-06 PROCEDURE — 99213 OFFICE O/P EST LOW 20 MIN: CPT | Performed by: SURGERY

## (undated) DEVICE — ENDOSCOPY PORT CONNECTOR FOR OLYMPUS® SCOPES: Brand: ERBE

## (undated) DEVICE — Device

## (undated) DEVICE — VLV SXN AIR/H2O ORCAPOD3 1P/U STRL

## (undated) DEVICE — FRCP BIOP COLD ENDOJAW ALLGTR W/NDL 2.8X2300MM BLU

## (undated) DEVICE — PAD GRND REM POLYHESIVE A/ DISP

## (undated) DEVICE — HYBRID TUBING/CAP SET FOR OLYMPUS® SCOPES: Brand: ERBE

## (undated) DEVICE — LUBE JELLY PK/2.75GM STRL BX/144